# Patient Record
Sex: FEMALE | HISPANIC OR LATINO | Employment: FULL TIME | URBAN - METROPOLITAN AREA
[De-identification: names, ages, dates, MRNs, and addresses within clinical notes are randomized per-mention and may not be internally consistent; named-entity substitution may affect disease eponyms.]

---

## 2023-01-12 ENCOUNTER — APPOINTMENT (OUTPATIENT)
Dept: RADIOLOGY | Facility: MEDICAL CENTER | Age: 33
End: 2023-01-12
Attending: EMERGENCY MEDICINE
Payer: COMMERCIAL

## 2023-01-12 ENCOUNTER — HOSPITAL ENCOUNTER (EMERGENCY)
Facility: MEDICAL CENTER | Age: 33
End: 2023-01-12
Attending: EMERGENCY MEDICINE
Payer: COMMERCIAL

## 2023-01-12 VITALS
OXYGEN SATURATION: 96 % | RESPIRATION RATE: 15 BRPM | HEART RATE: 109 BPM | SYSTOLIC BLOOD PRESSURE: 138 MMHG | WEIGHT: 189.82 LBS | HEIGHT: 62 IN | DIASTOLIC BLOOD PRESSURE: 75 MMHG | TEMPERATURE: 98 F | BODY MASS INDEX: 34.93 KG/M2

## 2023-01-12 DIAGNOSIS — R31.9 URINARY TRACT INFECTION WITH HEMATURIA, SITE UNSPECIFIED: ICD-10-CM

## 2023-01-12 DIAGNOSIS — O20.9 VAGINAL BLEEDING IN PREGNANCY, FIRST TRIMESTER: ICD-10-CM

## 2023-01-12 DIAGNOSIS — N39.0 URINARY TRACT INFECTION WITH HEMATURIA, SITE UNSPECIFIED: ICD-10-CM

## 2023-01-12 LAB
ALBUMIN SERPL BCP-MCNC: 4 G/DL (ref 3.2–4.9)
ALBUMIN/GLOB SERPL: 1.2 G/DL
ALP SERPL-CCNC: 68 U/L (ref 30–99)
ALT SERPL-CCNC: 21 U/L (ref 2–50)
ANION GAP SERPL CALC-SCNC: 14 MMOL/L (ref 7–16)
APPEARANCE UR: CLEAR
AST SERPL-CCNC: 21 U/L (ref 12–45)
B-HCG SERPL-ACNC: ABNORMAL MIU/ML (ref 0–5)
BACTERIA #/AREA URNS HPF: ABNORMAL /HPF
BACTERIA GENITAL QL WET PREP: NORMAL
BASOPHILS # BLD AUTO: 0.2 % (ref 0–1.8)
BASOPHILS # BLD: 0.03 K/UL (ref 0–0.12)
BILIRUB SERPL-MCNC: 0.2 MG/DL (ref 0.1–1.5)
BILIRUB UR QL STRIP.AUTO: NEGATIVE
BUN SERPL-MCNC: 7 MG/DL (ref 8–22)
CALCIUM ALBUM COR SERPL-MCNC: 10.3 MG/DL (ref 8.5–10.5)
CALCIUM SERPL-MCNC: 10.3 MG/DL (ref 8.5–10.5)
CHLORIDE SERPL-SCNC: 104 MMOL/L (ref 96–112)
CO2 SERPL-SCNC: 19 MMOL/L (ref 20–33)
COLOR UR: YELLOW
CREAT SERPL-MCNC: 0.45 MG/DL (ref 0.5–1.4)
EOSINOPHIL # BLD AUTO: 0.08 K/UL (ref 0–0.51)
EOSINOPHIL NFR BLD: 0.7 % (ref 0–6.9)
EPI CELLS #/AREA URNS HPF: ABNORMAL /HPF
ERYTHROCYTE [DISTWIDTH] IN BLOOD BY AUTOMATED COUNT: 41.2 FL (ref 35.9–50)
GFR SERPLBLD CREATININE-BSD FMLA CKD-EPI: 129 ML/MIN/1.73 M 2
GLOBULIN SER CALC-MCNC: 3.3 G/DL (ref 1.9–3.5)
GLUCOSE SERPL-MCNC: 93 MG/DL (ref 65–99)
GLUCOSE UR STRIP.AUTO-MCNC: NEGATIVE MG/DL
HCT VFR BLD AUTO: 40.6 % (ref 37–47)
HGB BLD-MCNC: 14 G/DL (ref 12–16)
HYALINE CASTS #/AREA URNS LPF: ABNORMAL /LPF
IMM GRANULOCYTES # BLD AUTO: 0.06 K/UL (ref 0–0.11)
IMM GRANULOCYTES NFR BLD AUTO: 0.5 % (ref 0–0.9)
KETONES UR STRIP.AUTO-MCNC: 15 MG/DL
LEUKOCYTE ESTERASE UR QL STRIP.AUTO: ABNORMAL
LYMPHOCYTES # BLD AUTO: 1.99 K/UL (ref 1–4.8)
LYMPHOCYTES NFR BLD: 16.6 % (ref 22–41)
MCH RBC QN AUTO: 29.7 PG (ref 27–33)
MCHC RBC AUTO-ENTMCNC: 34.5 G/DL (ref 33.6–35)
MCV RBC AUTO: 86.2 FL (ref 81.4–97.8)
MICRO URNS: ABNORMAL
MONOCYTES # BLD AUTO: 0.76 K/UL (ref 0–0.85)
MONOCYTES NFR BLD AUTO: 6.3 % (ref 0–13.4)
MUCOUS THREADS #/AREA URNS HPF: ABNORMAL /HPF
NEUTROPHILS # BLD AUTO: 9.1 K/UL (ref 2–7.15)
NEUTROPHILS NFR BLD: 75.7 % (ref 44–72)
NITRITE UR QL STRIP.AUTO: POSITIVE
NRBC # BLD AUTO: 0 K/UL
NRBC BLD-RTO: 0 /100 WBC
NUMBER OF RH DOSES IND 8505RD: NORMAL
PH UR STRIP.AUTO: 5.5 [PH] (ref 5–8)
PLATELET # BLD AUTO: 191 K/UL (ref 164–446)
PMV BLD AUTO: 11.4 FL (ref 9–12.9)
POTASSIUM SERPL-SCNC: 3.4 MMOL/L (ref 3.6–5.5)
PROT SERPL-MCNC: 7.3 G/DL (ref 6–8.2)
PROT UR QL STRIP: NEGATIVE MG/DL
RBC # BLD AUTO: 4.71 M/UL (ref 4.2–5.4)
RBC # URNS HPF: ABNORMAL /HPF
RBC UR QL AUTO: NEGATIVE
RH BLD: NORMAL
SIGNIFICANT IND 70042: NORMAL
SITE SITE: NORMAL
SODIUM SERPL-SCNC: 137 MMOL/L (ref 135–145)
SOURCE SOURCE: NORMAL
SP GR UR STRIP.AUTO: 1.02
UROBILINOGEN UR STRIP.AUTO-MCNC: 0.2 MG/DL
WBC # BLD AUTO: 12 K/UL (ref 4.8–10.8)
WBC #/AREA URNS HPF: ABNORMAL /HPF

## 2023-01-12 PROCEDURE — 84702 CHORIONIC GONADOTROPIN TEST: CPT

## 2023-01-12 PROCEDURE — 80053 COMPREHEN METABOLIC PANEL: CPT

## 2023-01-12 PROCEDURE — 85025 COMPLETE CBC W/AUTO DIFF WBC: CPT

## 2023-01-12 PROCEDURE — 87491 CHLMYD TRACH DNA AMP PROBE: CPT

## 2023-01-12 PROCEDURE — 86901 BLOOD TYPING SEROLOGIC RH(D): CPT

## 2023-01-12 PROCEDURE — 76817 TRANSVAGINAL US OBSTETRIC: CPT

## 2023-01-12 PROCEDURE — A9270 NON-COVERED ITEM OR SERVICE: HCPCS | Performed by: EMERGENCY MEDICINE

## 2023-01-12 PROCEDURE — 81001 URINALYSIS AUTO W/SCOPE: CPT

## 2023-01-12 PROCEDURE — 87591 N.GONORRHOEAE DNA AMP PROB: CPT

## 2023-01-12 PROCEDURE — 99284 EMERGENCY DEPT VISIT MOD MDM: CPT

## 2023-01-12 PROCEDURE — 700102 HCHG RX REV CODE 250 W/ 637 OVERRIDE(OP): Performed by: EMERGENCY MEDICINE

## 2023-01-12 PROCEDURE — 87086 URINE CULTURE/COLONY COUNT: CPT

## 2023-01-12 PROCEDURE — 36415 COLL VENOUS BLD VENIPUNCTURE: CPT

## 2023-01-12 RX ORDER — CEPHALEXIN 500 MG/1
500 CAPSULE ORAL ONCE
Status: COMPLETED | OUTPATIENT
Start: 2023-01-12 | End: 2023-01-12

## 2023-01-12 RX ORDER — CEPHALEXIN 500 MG/1
500 CAPSULE ORAL 2 TIMES DAILY
Qty: 10 CAPSULE | Refills: 0 | Status: ACTIVE | OUTPATIENT
Start: 2023-01-12 | End: 2023-01-17

## 2023-01-12 RX ADMIN — CEPHALEXIN 500 MG: 500 CAPSULE ORAL at 20:03

## 2023-01-13 LAB
C TRACH DNA GENITAL QL NAA+PROBE: NEGATIVE
N GONORRHOEA DNA GENITAL QL NAA+PROBE: NEGATIVE
SPECIMEN SOURCE: NORMAL

## 2023-01-13 NOTE — ED PROVIDER NOTES
"ED Provider Note    CHIEF COMPLAINT  Chief Complaint   Patient presents with    Vaginal Bleeding     14 weeks pregnant, blood when going to the restroom         HPI    Primary care provider: Pcp Pt States None   History obtained from: Patient and video   History limited by: None     Lillian Wesley is a 32 y.o. female who presents to the ED with  and daughter complaining of vaginal bleeding with 14 weeks pregnancy.  Patient is  at 14 weeks and has not had prenatal care during this pregnancy so far.  She reports urinating around 2:00 this afternoon and noticed a few drops of vaginal bleeding.  She denies any pain.  She denies fever/chills/chest pain/shortness of breath or difficulty breathing/nausea/vomiting/diarrhea/constipation/dysuria/rash/edema.  Patient is unsure of her blood type.    REVIEW OF SYSTEMS  Please see HPI for pertinent positives/negatives.  All other systems reviewed and are negative.     PAST MEDICAL HISTORY  No past medical history on file.     SURGICAL HISTORY  No past surgical history on file.     SOCIAL HISTORY  Social History     Tobacco Use    Smoking status: Never    Smokeless tobacco: Not on file   Substance and Sexual Activity    Alcohol use: No    Drug use: Not on file    Sexual activity: Not on file        FAMILY HISTORY  No family history on file.     CURRENT MEDICATIONS  Home Medications       Reviewed by Mabel Rodriguez R.N. (Registered Nurse) on 23 at 1731  Med List Status: Not Addressed     Medication Last Dose Status        Patient Nacho Taking any Medications                            ALLERGIES  No Known Allergies     PHYSICAL EXAM  VITAL SIGNS: /75   Pulse (!) 109   Temp 36.7 °C (98 °F) (Temporal)   Resp 15   Ht 1.575 m (5' 2\")   Wt 86.1 kg (189 lb 13.1 oz)   LMP  (LMP Unknown)   SpO2 96%   BMI 34.72 kg/m²  @VITALIY[[id number]::@     Pulse ox interpretation: 98% I interpret this pulse ox as normal     Constitutional: Well developed, well " nourished, alert in no apparent distress, nontoxic appearance    HENT: No external signs of trauma, normocephalic, mask on due to COVID-19 pandemic  Eyes: PERRL, conjunctiva without erythema, no discharge, no icterus    Neck: Soft and supple, trachea midline, no stridor, no tenderness, no LAD, no JVD, good ROM    Cardiovascular: Regular rate and rhythm, no murmurs/rubs/gallops, strong distal pulses and good perfusion    Thorax & Lungs: No respiratory distress, CTAB   Abdomen: Soft, nontender, nondistended, no guarding, no rebound, normal BS    : Female chaperon present for exam.  NEFG, vaginal canal without discharge/blood/FB, cervix closed, no CMT, no uterine TTP, no AT or palpable mass    Back: No CVAT    Extremities: No cyanosis, no edema, no gross deformity, good ROM, no tenderness, intact distal pulses with brisk cap refill    Skin: Warm, dry, no pallor/cyanosis, no rash noted    Lymphatic: No lymphadenopathy noted    Neuro: A/O times 3, no focal deficits noted    Psychiatric: Cooperative, normal mood and affect, normal judgement, appropriate for clinical situation        DIAGNOSTIC STUDIES / PROCEDURES        LABS  All labs reviewed by me.     Results for orders placed or performed during the hospital encounter of 01/12/23   CBC WITH DIFFERENTIAL   Result Value Ref Range    WBC 12.0 (H) 4.8 - 10.8 K/uL    RBC 4.71 4.20 - 5.40 M/uL    Hemoglobin 14.0 12.0 - 16.0 g/dL    Hematocrit 40.6 37.0 - 47.0 %    MCV 86.2 81.4 - 97.8 fL    MCH 29.7 27.0 - 33.0 pg    MCHC 34.5 33.6 - 35.0 g/dL    RDW 41.2 35.9 - 50.0 fL    Platelet Count 191 164 - 446 K/uL    MPV 11.4 9.0 - 12.9 fL    Neutrophils-Polys 75.70 (H) 44.00 - 72.00 %    Lymphocytes 16.60 (L) 22.00 - 41.00 %    Monocytes 6.30 0.00 - 13.40 %    Eosinophils 0.70 0.00 - 6.90 %    Basophils 0.20 0.00 - 1.80 %    Immature Granulocytes 0.50 0.00 - 0.90 %    Nucleated RBC 0.00 /100 WBC    Neutrophils (Absolute) 9.10 (H) 2.00 - 7.15 K/uL    Lymphs (Absolute) 1.99 1.00  - 4.80 K/uL    Monos (Absolute) 0.76 0.00 - 0.85 K/uL    Eos (Absolute) 0.08 0.00 - 0.51 K/uL    Baso (Absolute) 0.03 0.00 - 0.12 K/uL    Immature Granulocytes (abs) 0.06 0.00 - 0.11 K/uL    NRBC (Absolute) 0.00 K/uL   COMP METABOLIC PANEL   Result Value Ref Range    Sodium 137 135 - 145 mmol/L    Potassium 3.4 (L) 3.6 - 5.5 mmol/L    Chloride 104 96 - 112 mmol/L    Co2 19 (L) 20 - 33 mmol/L    Anion Gap 14.0 7.0 - 16.0    Glucose 93 65 - 99 mg/dL    Bun 7 (L) 8 - 22 mg/dL    Creatinine 0.45 (L) 0.50 - 1.40 mg/dL    Calcium 10.3 8.5 - 10.5 mg/dL    AST(SGOT) 21 12 - 45 U/L    ALT(SGPT) 21 2 - 50 U/L    Alkaline Phosphatase 68 30 - 99 U/L    Total Bilirubin 0.2 0.1 - 1.5 mg/dL    Albumin 4.0 3.2 - 4.9 g/dL    Total Protein 7.3 6.0 - 8.2 g/dL    Globulin 3.3 1.9 - 3.5 g/dL    A-G Ratio 1.2 g/dL   RH TYPE FOR RHOGAM FROM E.D.   Result Value Ref Range    Emergency Department Rh Typing POS     Number Of Rh Doses Indicated ZERO    HCG QUANTITATIVE   Result Value Ref Range    Bhcg 06985.0 (H) 0.0 - 5.0 mIU/mL   URINALYSIS,CULTURE IF INDICATED    Specimen: Urine   Result Value Ref Range    Color Yellow     Character Clear     Specific Gravity 1.022 <1.035    Ph 5.5 5.0 - 8.0    Glucose Negative Negative mg/dL    Ketones 15 (A) Negative mg/dL    Protein Negative Negative mg/dL    Bilirubin Negative Negative    Urobilinogen, Urine 0.2 Negative    Nitrite Positive (A) Negative    Leukocyte Esterase Trace (A) Negative    Occult Blood Negative Negative    Micro Urine Req Microscopic    ESTIMATED GFR   Result Value Ref Range    GFR (CKD-EPI) 129 >60 mL/min/1.73 m 2   CORRECTED CALCIUM   Result Value Ref Range    Correct Calcium 10.3 8.5 - 10.5 mg/dL   Chlamydia/GC, PCR (Genital/Anal swab)   Result Value Ref Range    Source Genital    WET PREP    Specimen: Genital   Result Value Ref Range    Significant Indicator NEG     Source GEN     Site Vaginal     Wet Prep For Parasites       Moderate WBCs seen.  No yeast.  No motile  Trichomonas seen.  No clue cells seen.     URINE MICROSCOPIC (W/UA)   Result Value Ref Range    WBC 10-20 (A) /hpf    RBC 0-2 /hpf    Bacteria Moderate (A) None /hpf    Epithelial Cells Few /hpf    Mucous Threads Few /hpf    Hyaline Cast 0-2 /lpf   URINE CULTURE(NEW)    Specimen: Urine   Result Value Ref Range    Significant Indicator NEG     Source UR     Site -     Culture Result -         RADIOLOGY  I have independently interpreted the diagnostic imaging associated with this visit and am waiting the final reading from the radiologist.     US-OB TRANSVAGINAL ONLY   Final Result            Single living intrauterine pregnancy at 14 weeks, 0 days estimated gestational age.             COURSE & MEDICAL DECISION MAKING  Nursing notes, VS, PMSFHx reviewed in chart.     Review of past medical records shows the patient was seen at urgent care on January 11, 2015 for finger laceration.      Differential diagnoses considered include but are not limited to: Pregnancy, ectopic, Ab/miscarriage, fetal demise, STI, PID, cervicitis, vaginitis       ED Observation Status? Yes; I am placing the patient in to an observation status due to a diagnostic uncertainty as well as therapeutic intensity. Patient placed in observation status at 6:18 PM, 1/12/2023.       INITIAL ASSESSMENT AND PLAN  Care Narrative: This is a 32-year-old female at 14 weeks pregnancy who presents complaining of vaginal bleeding.  Will obtain labs and ultrasound and perform pelvic exam.      Escalation of care considered, and ultimately not performed: acute inpatient care management, however at this time, the patient is most appropriate for outpatient management.     Barriers to care at this time, including but not limited to: Select: Patient does not have established PCP.     Decision tools and prescription drugs considered including, but not limited to: Select: Antibiotics   .        History and physical exam as above.  Work-up for vaginal bleeding with  pregnancy was initiated.  Patient is Rh+ and does not require RhoGAM.  Ultrasound shows single IUP at 14 weeks.  Mild leukocytosis likely reactive in nature.  Patient with findings of UTI and will be started on Keflex given that she is pregnant.  Her pelvic exam is benign and rest of her exam is benign as well.  I discussed the findings with patient and  using video .  This is an alert and pleasant patient in no acute distress and nontoxic in appearance and clinically stable during her ED stay.  No further vaginal bleeding.  No evidence for emergent or surgical abdomen.  She was advised on close outpatient follow-up and given return to ED precautions.  Patient verbalized understanding and agreed to plan of care with no further questions or concerns.      The patient is referred to a primary physician for blood pressure management, diabetic screening, and for all other preventative health concerns.       FINAL IMPRESSION  1. Vaginal bleeding in pregnancy, first trimester Acute   2. Urinary tract infection with hematuria, site unspecified Active          DISPOSITION  Patient will be discharged home in stable condition.       FOLLOW UP  Please follow-up with your OB/GYN    Call in 1 day      [facility], Emergency Dept  [address] [place] [id number] [phone]    If symptoms worsen         OUTPATIENT MEDICATIONS  Discharge Medication List as of 1/12/2023  8:07 PM        START taking these medications    Details   cephALEXin (KEFLEX) 500 MG Cap Take 1 Capsule by mouth 2 times a day for 5 days., Disp-10 Capsule, R-0, Print Rx Paper                Electronically signed by: Julian Aguilar D.O., 1/12/2023 6:08 PM      Portions of this record were made with voice recognition software.  Despite my review, spelling/grammar/context errors may still remain.  Interpretation of this chart should be taken in this context.

## 2023-01-13 NOTE — ED NOTES
"Pt discharged to lobby with steady gait, AOx4. IV discontinued and gauze placed, pt in possession of belongings. Pt provided discharge education and information pertaining to medications and follow up appointments,  used #[id number]. Pt received copy of discharge instructions and verbalized understanding. /75   Pulse (!) 109   Temp 36.7 °C (98 °F) (Temporal)   Resp 15   Ht 1.575 m (5' 2\")   Wt 86.1 kg (189 lb 13.1 oz)   LMP  (LMP Unknown)   SpO2 96%   BMI 34.72 kg/m²     "

## 2023-01-13 NOTE — ED TRIAGE NOTES
"Chief Complaint   Patient presents with    Vaginal Bleeding     14 weeks pregnant, blood when going to the restroom      Pt ambulatory into triage for complaints of vaginal bleeding after going to the restroom. Pt is approximately 14 weeks pregnant. Pt states she in not actively bleeding. Pt has been unable to see OB yet, has not has an US confirming pregnancy. Protocol ordered.     Pt is alert and oriented, speaking in full sentences, follows commands and responds appropriately to questions.      Pt placed in lobby. Pt educated on triage process and apologized for wait time. Pt encouraged to alert staff for any changes.     Patient and staff wearing appropriate PPE.      /88   Pulse (!) 112   Temp 36.8 °C (98.2 °F) (Temporal)   Resp 16   Ht 1.575 m (5' 2\")   Wt 86.1 kg (189 lb 13.1 oz)   SpO2 97%       "

## 2023-01-15 LAB
BACTERIA UR CULT: NORMAL
SIGNIFICANT IND 70042: NORMAL
SITE SITE: NORMAL
SOURCE SOURCE: NORMAL

## 2023-04-21 ENCOUNTER — APPOINTMENT (OUTPATIENT)
Dept: OBGYN | Facility: CLINIC | Age: 33
End: 2023-04-21
Payer: MEDICAID

## 2023-04-21 ENCOUNTER — INITIAL PRENATAL (OUTPATIENT)
Dept: OBGYN | Facility: CLINIC | Age: 33
End: 2023-04-21
Payer: MEDICAID

## 2023-04-21 ENCOUNTER — HOSPITAL ENCOUNTER (OUTPATIENT)
Facility: MEDICAL CENTER | Age: 33
End: 2023-04-21
Attending: NURSE PRACTITIONER
Payer: MEDICAID

## 2023-04-21 VITALS
WEIGHT: 194 LBS | DIASTOLIC BLOOD PRESSURE: 70 MMHG | BODY MASS INDEX: 38.09 KG/M2 | HEIGHT: 60 IN | SYSTOLIC BLOOD PRESSURE: 112 MMHG

## 2023-04-21 DIAGNOSIS — L83 ACANTHOSIS NIGRICANS: ICD-10-CM

## 2023-04-21 DIAGNOSIS — Z34.83 ENCOUNTER FOR SUPERVISION OF OTHER NORMAL PREGNANCY IN THIRD TRIMESTER: ICD-10-CM

## 2023-04-21 DIAGNOSIS — Z34.83 ENCOUNTER FOR SUPERVISION OF OTHER NORMAL PREGNANCY IN THIRD TRIMESTER: Primary | ICD-10-CM

## 2023-04-21 PROBLEM — Z34.93 ENCOUNTER FOR SUPERVISION OF NORMAL PREGNANCY IN THIRD TRIMESTER: Status: ACTIVE | Noted: 2023-04-21

## 2023-04-21 LAB — GLUCOSE 1H P 50 G GLC PO SERPL-MCNC: 163 MG/DL (ref 70–139)

## 2023-04-21 PROCEDURE — 80307 DRUG TEST PRSMV CHEM ANLYZR: CPT

## 2023-04-21 PROCEDURE — 0500F INITIAL PRENATAL CARE VISIT: CPT | Performed by: NURSE PRACTITIONER

## 2023-04-21 PROCEDURE — 90715 TDAP VACCINE 7 YRS/> IM: CPT | Performed by: NURSE PRACTITIONER

## 2023-04-21 PROCEDURE — 36415 COLL VENOUS BLD VENIPUNCTURE: CPT

## 2023-04-21 PROCEDURE — 87591 N.GONORRHOEAE DNA AMP PROB: CPT

## 2023-04-21 PROCEDURE — 90471 IMMUNIZATION ADMIN: CPT | Performed by: NURSE PRACTITIONER

## 2023-04-21 PROCEDURE — 87491 CHLMYD TRACH DNA AMP PROBE: CPT

## 2023-04-21 PROCEDURE — 82950 GLUCOSE TEST: CPT

## 2023-04-21 ASSESSMENT — ENCOUNTER SYMPTOMS
RESPIRATORY NEGATIVE: 1
MUSCULOSKELETAL NEGATIVE: 1
CARDIOVASCULAR NEGATIVE: 1
PSYCHIATRIC NEGATIVE: 1
CONSTITUTIONAL NEGATIVE: 1
GASTROINTESTINAL NEGATIVE: 1
EYES NEGATIVE: 1
NEUROLOGICAL NEGATIVE: 1

## 2023-04-21 ASSESSMENT — FIBROSIS 4 INDEX: FIB4 SCORE: 0.82

## 2023-04-21 NOTE — LETTER
Cuente los Movimientos de granger Bebé  Otro paso importante para la samy de granger bebé    Lillian Lupillo     [facility]            Dept: [phone]    ¿Cuántas semanas tiene de embarazo? 28w1d    Fecha cuando tiene que comenzar a contar el movimiento: 4/21/23                  Francia debe usar rosanne diagrama    Lake manera en que granger doctor puede controlar a samy de granger bebé es sabiendo cuantas veces se mueve granger bebé en el útero, o por medio de las “pataditas”.  Usted podrá ayudarle a granger médico al usar cada día el siguiente diagrama.    Cada día, usted debe prestar atención a cuantas horas le lleva a granger bebé moverse 10 veces.  Comience a contar en la mañana, lo antes posible después de haberse levantado.    Primeramente, escriba la hora en que se mueve granger bebé, hasta llegar a 10 veces.  Colóquele un check o palomita a cada cuadrito cada vez que granger bebé se mueva hasta que complete 10 veces.  Escriba la hora cuando termine de contar 10 veces en la última columna.  Sume el total del tiempo que le llevó contar los 10 movimientos.  Finalmente, complete el cuadrito de cuantas horas le llevó hacerlo.    Después de mumtaz contado los 10 movimientos, ya no tendrá que contar los demás movimientos por el miguel del día.  A la mañana siguiente, comience a contar de nuevo cuantas veces se mueve el bebé desde el momento en que se levante.    ¿Qué tendría que considerarse un “movimiento”?  Es difícil de decirlo porque es distinto de lake madre a otra, y de un embarazo a otro.  Lo importante es que cuente el movimiento de la misma manera roman el transcurso de granger embarazo.  Si tiene preguntas adicionales, pregúntele a granger doctor.    ¡Cuente cuidadosamente cada día!     MUESTRA:  Semana 28    ¿Cuántas horas le ha llevado sentir 10 movimientos?        Hora de Inicio     1     2     3     4     5     6     7     8     9     10   Hora de Finlizar   Mitchell. 8:20           11:40   Mar.               Mildred Stephens.                Vie.               Sáb.               Dom.                 IMPORTANTE:  Usted debe contactar a granger doctor si le lleva más de 2 horas sentir 10 movimientos de granger bebé.    Cada mañana, escriba la hora de inicio y comience a contar los movimientos de granger bebé.  Hágalo colocándole un check o palomita a cada cuadrito cada vez que sienta un movimiento de granger bebé.  Cuando haya sentido 10 “pataditas”, escriba la hora en que terminó de contar en la última columna.  Luego, complete en la cajita (arriba de la tim de check o palomita) el número total de horas que le llevó hacerlo.  Asegúrese de leer completamente las instrucciones en la página anterior.

## 2023-04-21 NOTE — PROGRESS NOTES
NOB today. ER  ultrasound 1/12/23  LMP: unknown  Last pap: 11 years ago=negative  Phone # [phone]  Pharmacy confirmed  On PNV  Not interested in genetic testing.  Kick count sheet given today.  TDap today  C/o R foot getting numb and burning sensation going up to her knee.  1 gtt pended and instructions given to patient.  BTL declined

## 2023-04-21 NOTE — PROGRESS NOTES
Subjective     S:  Lillian Wesley is a 33 y.o.  female  @ She is 28w1d with an VANNESSA of Estimated Date of Delivery: 23 based off of US who presents for her new OB exam.      Her OB hx includes  x1.   History of HSV I or II in self or partner: no  History of STIs in self or partner: no  History of Thyroid problems: no    One ER visit for bleeding in this pregnancy. She has no complaints.  Too late for AFP.  Declines CF.  Reports no FM, VB, LOF, or cramping.  Denies dysuria, vaginal DC.  Pt is single and lives with family. FOB is not involved, different from first baby. She is currently working as , discussed heavy lifting, no chemical exposure.  Pregnancy is unplanned but welcomed.    O:    Vitals:    23 1320 23 1323   BP: 112/70    Weight: 194 lb    Height:  5'    See H&P Prenatal Physical.  Wet mount: not indicated        FHTs: 145        Fundal ht: 31cm     A:   1.  IUP @ 28w1d VANNESSA: Estimated Date of Delivery: 23 per US         2.  S=D        3.    Patient Active Problem List    Diagnosis Date Noted    Encounter for supervision of normal pregnancy in third trimester 2023    Acanthosis nigricans 2023         P:  1.  GC/CT ordered. Pap deferred to pp.         2.  Prenatal labs, 1 hr GTT and UDS ordered - lab slip given        3.  Discussed diet and exercise during pregnancy. Encouraged good nutrition, and daily exercise including walking or swimming. Discussed expected weight gain during pregnancy.              4.  Discussed adequate hydration during pregnancy.        5.  NOB packet given        6.  Return to office in 2 wks        7.  Complete OB US at next availability        8.  Pregnancy guide provided        9. TDAP today       10. BTL declined       11. Kick count information provided              HPI    Review of Systems   Constitutional: Negative.    HENT: Negative.     Eyes: Negative.    Respiratory: Negative.     Cardiovascular: Negative.     Gastrointestinal: Negative.    Genitourinary: Negative.    Musculoskeletal: Negative.    Skin: Negative.    Neurological: Negative.    Endo/Heme/Allergies: Negative.    Psychiatric/Behavioral: Negative.              Objective     /70   Ht 5'   Wt 194 lb   LMP  (LMP Unknown)   BMI 37.89 kg/m²      Physical Exam  Vitals and nursing note reviewed.   Constitutional:       Appearance: She is well-developed.   Cardiovascular:      Rate and Rhythm: Normal rate and regular rhythm.      Heart sounds: Normal heart sounds.   Pulmonary:      Effort: Pulmonary effort is normal.      Breath sounds: Normal breath sounds.   Abdominal:      Palpations: Abdomen is soft.   Genitourinary:     Vagina: Normal.      Comments: Uterus enlarged, c/w 31 wk ga  Musculoskeletal:         General: Normal range of motion.      Cervical back: Normal range of motion and neck supple.   Skin:     General: Skin is warm and dry.   Neurological:      Mental Status: She is alert and oriented to person, place, and time.      Deep Tendon Reflexes: Reflexes are normal and symmetric.   Psychiatric:         Behavior: Behavior normal.         Thought Content: Thought content normal.         Judgment: Judgment normal.                           Assessment & Plan        1. Encounter for supervision of other normal pregnancy in third trimester    - URINE DRUG SCREEN W/CONF (AR); Future  - US-OB 2ND 3RD TRI COMPLETE; Future  - Chlamydia/GC, PCR (Urine); Future  - GLUCOSE 1HR GESTATIONAL; Future  - TDAP VACCINE =>6YO IM    2. Acanthosis nigricans

## 2023-04-22 LAB
C TRACH DNA SPEC QL NAA+PROBE: NEGATIVE
N GONORRHOEA DNA SPEC QL NAA+PROBE: NEGATIVE
SPECIMEN SOURCE: NORMAL

## 2023-04-23 LAB
AMPHET CTO UR CFM-MCNC: NEGATIVE NG/ML
BARBITURATES CTO UR CFM-MCNC: NEGATIVE NG/ML
BENZODIAZ CTO UR CFM-MCNC: NEGATIVE NG/ML
CANNABINOIDS CTO UR CFM-MCNC: NEGATIVE NG/ML
COCAINE CTO UR CFM-MCNC: NEGATIVE NG/ML
DRUG COMMENT 753798: NORMAL
METHADONE CTO UR CFM-MCNC: NEGATIVE NG/ML
OPIATES CTO UR CFM-MCNC: NEGATIVE NG/ML
PCP CTO UR CFM-MCNC: NEGATIVE NG/ML
PROPOXYPH CTO UR CFM-MCNC: NEGATIVE NG/ML

## 2023-04-24 ENCOUNTER — APPOINTMENT (OUTPATIENT)
Dept: RADIOLOGY | Facility: IMAGING CENTER | Age: 33
End: 2023-04-24
Attending: NURSE PRACTITIONER
Payer: MEDICAID

## 2023-04-24 DIAGNOSIS — Z34.83 ENCOUNTER FOR SUPERVISION OF OTHER NORMAL PREGNANCY IN THIRD TRIMESTER: ICD-10-CM

## 2023-04-24 DIAGNOSIS — Z34.82 ENCOUNTER FOR SUPERVISION OF OTHER NORMAL PREGNANCY IN SECOND TRIMESTER: ICD-10-CM

## 2023-04-24 DIAGNOSIS — R73.09 ELEVATED GLUCOSE TOLERANCE TEST: ICD-10-CM

## 2023-04-24 PROCEDURE — 76805 OB US >/= 14 WKS SNGL FETUS: CPT | Mod: TC | Performed by: NURSE PRACTITIONER

## 2023-04-26 ENCOUNTER — TELEPHONE (OUTPATIENT)
Dept: OBGYN | Facility: CLINIC | Age: 33
End: 2023-04-26
Payer: MEDICAID

## 2023-04-26 NOTE — TELEPHONE ENCOUNTER
----- Message from GABRIELLE Nielsen sent at 4/24/2023  7:47 AM PDT -----  1 hr GTT elevated, 3 hr ordered-no PNL see in chart, reordered    04/26/23  1606 Called pt on number on file [phone] and the person answering the phone stated that was not pt's number and he did not understand why we have this number to contact pt. Explained that is the only number we have, this person provided a new number for pt [phone].   1608 Called pt at number provided above but there was no answer and it went straight to , but VM not set up. Called the previous number and and person agreed to deliver message for pt to call this RN line back.   1610 pt walk-in to the clinic. Pt notified of abnormal 1hr gtt and need to do 3hr gtt this time. Pt instructed to fast 10-12hr prior to testing. Pt informed she is only allow to drink plain water during fasting time. Advised to bring a snack for after the test is done. Pt notified will be staying in the labs for the 3hr. Pt agreed to do it tomorrow 4/28/2023. Pt agreed and verbalized understanding.

## 2023-04-27 PROBLEM — O40.3XX0 POLYHYDRAMNIOS IN THIRD TRIMESTER: Status: ACTIVE | Noted: 2023-04-27

## 2023-05-05 ENCOUNTER — ROUTINE PRENATAL (OUTPATIENT)
Dept: OBGYN | Facility: CLINIC | Age: 33
End: 2023-05-05
Payer: MEDICAID

## 2023-05-05 VITALS — SYSTOLIC BLOOD PRESSURE: 108 MMHG | DIASTOLIC BLOOD PRESSURE: 60 MMHG | BODY MASS INDEX: 38.63 KG/M2 | WEIGHT: 197.8 LBS

## 2023-05-05 DIAGNOSIS — O40.3XX0 POLYHYDRAMNIOS IN THIRD TRIMESTER COMPLICATION, SINGLE OR UNSPECIFIED FETUS: ICD-10-CM

## 2023-05-05 DIAGNOSIS — B00.1 COLD SORE: ICD-10-CM

## 2023-05-05 PROCEDURE — 59025 FETAL NON-STRESS TEST: CPT | Performed by: NURSE PRACTITIONER

## 2023-05-05 PROCEDURE — 0502F SUBSEQUENT PRENATAL CARE: CPT | Performed by: NURSE PRACTITIONER

## 2023-05-05 RX ORDER — ACYCLOVIR 400 MG/1
400 TABLET ORAL 3 TIMES DAILY
Qty: 90 TABLET | Refills: 3 | Status: SHIPPED | OUTPATIENT
Start: 2023-05-05 | End: 2023-05-05

## 2023-05-05 RX ORDER — ACYCLOVIR 400 MG/1
400 TABLET ORAL 3 TIMES DAILY
Qty: 30 TABLET | Refills: 0 | Status: SHIPPED | OUTPATIENT
Start: 2023-05-05 | End: 2023-05-15

## 2023-05-05 ASSESSMENT — FIBROSIS 4 INDEX: FIB4 SCORE: 0.82

## 2023-05-05 NOTE — PROGRESS NOTES
S:  Pt is  at 30w1d for routine OB follow up.  Reports legs feeling numb at work, also reports painful cold sores on mouth. NO ED or hospital visits since last seen. Reports good FM.  Denies VB, LOF, RUCs or vaginal DC. Pt has not gotten PNLs drawn    O:  Please see above vitals.        1hr GTT: 163 -3 hr GTT ordered          A:  IUP at 30w1d  Patient Active Problem List    Diagnosis Date Noted    Cold sore 2023    Polyhydramnios in third trimester 2023    Elevated glucose tolerance test 2023    Encounter for supervision of normal pregnancy in third trimester 2023    Acanthosis nigricans 2023        P:  1.  PP contraception: unsure.        2.  Continue FKCs.          3.  Questions answered.          4.  Encouraged pt to tour L&D.          5.  Encourage adequate water intake.        6.  F/u 2 wks.        7.  NST today for polyhydramnios -Reactive        8.  US for CARINE ordered        9.  Rx for acyclovir

## 2023-05-05 NOTE — PROGRESS NOTES
Pt here for OB follow up  + FM  Denies VB, LOF or UC's  Good ph: [phone]  Pt states she has no complaints or concerns right now  : [id number]

## 2023-05-12 ENCOUNTER — ROUTINE PRENATAL (OUTPATIENT)
Dept: OBGYN | Facility: CLINIC | Age: 33
End: 2023-05-12
Payer: MEDICAID

## 2023-05-12 DIAGNOSIS — R73.09 ELEVATED GLUCOSE TOLERANCE TEST: ICD-10-CM

## 2023-05-12 DIAGNOSIS — O40.3XX0 POLYHYDRAMNIOS IN THIRD TRIMESTER COMPLICATION, SINGLE OR UNSPECIFIED FETUS: Primary | ICD-10-CM

## 2023-05-12 PROCEDURE — 59025 FETAL NON-STRESS TEST: CPT | Performed by: OBSTETRICS & GYNECOLOGY

## 2023-05-12 NOTE — PROGRESS NOTES
Lillian Wesley   Gestational age: 31w1d     Indication: polyhydramnios    NST Interpretation:  Baseline 135, Reactive

## 2023-05-19 ENCOUNTER — ROUTINE PRENATAL (OUTPATIENT)
Dept: OBGYN | Facility: CLINIC | Age: 33
End: 2023-05-19
Payer: MEDICAID

## 2023-05-19 ENCOUNTER — APPOINTMENT (OUTPATIENT)
Dept: RADIOLOGY | Facility: IMAGING CENTER | Age: 33
End: 2023-05-19
Attending: NURSE PRACTITIONER
Payer: MEDICAID

## 2023-05-19 VITALS — WEIGHT: 197.4 LBS | BODY MASS INDEX: 38.55 KG/M2 | SYSTOLIC BLOOD PRESSURE: 118 MMHG | DIASTOLIC BLOOD PRESSURE: 65 MMHG

## 2023-05-19 DIAGNOSIS — O13.3 GESTATIONAL HYPERTENSION, THIRD TRIMESTER: ICD-10-CM

## 2023-05-19 DIAGNOSIS — O40.3XX0 POLYHYDRAMNIOS IN THIRD TRIMESTER COMPLICATION, SINGLE OR UNSPECIFIED FETUS: ICD-10-CM

## 2023-05-19 DIAGNOSIS — Z34.83 ENCOUNTER FOR SUPERVISION OF OTHER NORMAL PREGNANCY IN THIRD TRIMESTER: Primary | ICD-10-CM

## 2023-05-19 LAB
NST ACOUSTIC STIMULATION: NORMAL
NST ACTION NECESSARY: NORMAL
NST ASSESSMENT: NORMAL
NST BASELINE: NORMAL
NST INDICATIONS: NORMAL
NST OTHER DATA: NORMAL
NST READ BY: NORMAL
NST RETURN: NORMAL
NST UTERINE ACTIVITY: NORMAL

## 2023-05-19 PROCEDURE — 3074F SYST BP LT 130 MM HG: CPT | Performed by: NURSE PRACTITIONER

## 2023-05-19 PROCEDURE — 59025 FETAL NON-STRESS TEST: CPT | Performed by: NURSE PRACTITIONER

## 2023-05-19 PROCEDURE — 76815 OB US LIMITED FETUS(S): CPT | Mod: TC | Performed by: NURSE PRACTITIONER

## 2023-05-19 PROCEDURE — 0502F SUBSEQUENT PRENATAL CARE: CPT | Performed by: NURSE PRACTITIONER

## 2023-05-19 PROCEDURE — 3078F DIAST BP <80 MM HG: CPT | Performed by: NURSE PRACTITIONER

## 2023-05-19 ASSESSMENT — FIBROSIS 4 INDEX: FIB4 SCORE: 0.82

## 2023-05-23 ENCOUNTER — TELEPHONE (OUTPATIENT)
Dept: OBGYN | Facility: CLINIC | Age: 33
End: 2023-05-23
Payer: MEDICAID

## 2023-05-24 DIAGNOSIS — O40.3XX0 POLYHYDRAMNIOS IN THIRD TRIMESTER COMPLICATION, SINGLE OR UNSPECIFIED FETUS: ICD-10-CM

## 2023-05-26 ENCOUNTER — ROUTINE PRENATAL (OUTPATIENT)
Dept: OBGYN | Facility: CLINIC | Age: 33
End: 2023-05-26
Payer: MEDICAID

## 2023-05-26 DIAGNOSIS — O40.3XX0 POLYHYDRAMNIOS IN THIRD TRIMESTER COMPLICATION, SINGLE OR UNSPECIFIED FETUS: ICD-10-CM

## 2023-05-26 PROCEDURE — 59025 FETAL NON-STRESS TEST: CPT | Performed by: PHYSICIAN ASSISTANT

## 2023-05-26 NOTE — PROGRESS NOTES
Per Consult with Dionne Coronado PAC regarding US results form 05/19/23. Pt was informed she still has Polyhydramnios and still needs to continue the weekly NSTs. OB provider will be talking to her about IOL at 39 weeks - 40 weeks and will  need  CARINE check at next visit.  Pt verbalized understanding and had no questions.

## 2023-06-02 ENCOUNTER — ROUTINE PRENATAL (OUTPATIENT)
Dept: OBGYN | Facility: CLINIC | Age: 33
End: 2023-06-02
Payer: MEDICAID

## 2023-06-02 ENCOUNTER — APPOINTMENT (OUTPATIENT)
Dept: RADIOLOGY | Facility: IMAGING CENTER | Age: 33
End: 2023-06-02
Attending: NURSE PRACTITIONER
Payer: MEDICAID

## 2023-06-02 DIAGNOSIS — O13.3 GESTATIONAL HYPERTENSION, THIRD TRIMESTER: ICD-10-CM

## 2023-06-02 DIAGNOSIS — O40.3XX0 POLYHYDRAMNIOS IN THIRD TRIMESTER COMPLICATION, SINGLE OR UNSPECIFIED FETUS: ICD-10-CM

## 2023-06-02 DIAGNOSIS — O40.3XX0 POLYHYDRAMNIOS IN THIRD TRIMESTER COMPLICATION, SINGLE OR UNSPECIFIED FETUS: Primary | ICD-10-CM

## 2023-06-02 PROCEDURE — 99999 PR NO CHARGE: CPT | Performed by: ADVANCED PRACTICE MIDWIFE

## 2023-06-02 PROCEDURE — 3074F SYST BP LT 130 MM HG: CPT | Performed by: ADVANCED PRACTICE MIDWIFE

## 2023-06-02 PROCEDURE — 3078F DIAST BP <80 MM HG: CPT | Performed by: ADVANCED PRACTICE MIDWIFE

## 2023-06-02 PROCEDURE — 59025 FETAL NON-STRESS TEST: CPT | Performed by: ADVANCED PRACTICE MIDWIFE

## 2023-06-02 PROCEDURE — 0502F SUBSEQUENT PRENATAL CARE: CPT | Performed by: ADVANCED PRACTICE MIDWIFE

## 2023-06-02 PROCEDURE — 76815 OB US LIMITED FETUS(S): CPT | Mod: TC | Performed by: NURSE PRACTITIONER

## 2023-06-02 NOTE — PROGRESS NOTES
Pt here today for OB follow up  Pt states no complaints  Reports +FM  Good # [phone] (home) [phone] (work)   Pharmacy Confirmed.  Chaperone offered and declined.

## 2023-06-06 VITALS — BODY MASS INDEX: 38.47 KG/M2 | DIASTOLIC BLOOD PRESSURE: 64 MMHG | SYSTOLIC BLOOD PRESSURE: 116 MMHG | WEIGHT: 197 LBS

## 2023-06-06 ASSESSMENT — FIBROSIS 4 INDEX: FIB4 SCORE: 0.82

## 2023-06-06 NOTE — PROGRESS NOTES
Patient here for MISHA visit at 34w5d of pregnancy. She affirms fetal movement, denies vaginal bleeding or cramping/regular contractions. She reports overall today she is feeling well and without complaints. No recent ER visits since last seen. Having ultrasound for fluid after this appointment. If resolved, may stop testing. Still outstanding for labs. Adequate hydration reviewed in detail with patient. Precautions and warning signs reviewed with patient.  RTC in 1 week(s) for MISHA visit and NST.     Tdap: 4/2023  BTL: declines  PP BCM: unsure  GBS: to be completed at upcoming visit.

## 2023-06-15 ENCOUNTER — ROUTINE PRENATAL (OUTPATIENT)
Dept: OBGYN | Facility: CLINIC | Age: 33
End: 2023-06-15
Payer: MEDICAID

## 2023-06-15 ENCOUNTER — HOSPITAL ENCOUNTER (EMERGENCY)
Facility: MEDICAL CENTER | Age: 33
End: 2023-06-15
Attending: OBSTETRICS & GYNECOLOGY | Admitting: OBSTETRICS & GYNECOLOGY
Payer: MEDICAID

## 2023-06-15 ENCOUNTER — HOSPITAL ENCOUNTER (OUTPATIENT)
Facility: MEDICAL CENTER | Age: 33
End: 2023-06-15
Attending: NURSE PRACTITIONER
Payer: MEDICAID

## 2023-06-15 VITALS — DIASTOLIC BLOOD PRESSURE: 64 MMHG | BODY MASS INDEX: 40.23 KG/M2 | WEIGHT: 206 LBS | SYSTOLIC BLOOD PRESSURE: 110 MMHG

## 2023-06-15 VITALS
HEART RATE: 113 BPM | SYSTOLIC BLOOD PRESSURE: 137 MMHG | WEIGHT: 205 LBS | DIASTOLIC BLOOD PRESSURE: 76 MMHG | OXYGEN SATURATION: 97 % | RESPIRATION RATE: 12 BRPM | TEMPERATURE: 97.3 F | HEIGHT: 60 IN | BODY MASS INDEX: 40.25 KG/M2

## 2023-06-15 DIAGNOSIS — Z34.83 ENCOUNTER FOR SUPERVISION OF OTHER NORMAL PREGNANCY IN THIRD TRIMESTER: Primary | ICD-10-CM

## 2023-06-15 DIAGNOSIS — O36.8130 DECREASED FETAL MOVEMENT AFFECTING MANAGEMENT OF PREGNANCY IN THIRD TRIMESTER, SINGLE OR UNSPECIFIED FETUS: ICD-10-CM

## 2023-06-15 DIAGNOSIS — Z34.83 ENCOUNTER FOR SUPERVISION OF OTHER NORMAL PREGNANCY IN THIRD TRIMESTER: ICD-10-CM

## 2023-06-15 LAB
APPEARANCE UR: CLEAR
COLOR UR AUTO: YELLOW
GLUCOSE UR QL STRIP.AUTO: NEGATIVE MG/DL
KETONES UR QL STRIP.AUTO: NEGATIVE MG/DL
LEUKOCYTE ESTERASE UR QL STRIP.AUTO: NEGATIVE
NITRITE UR QL STRIP.AUTO: NEGATIVE
NST ACOUSTIC STIMULATION: YES
NST ACTION NECESSARY: NORMAL
NST ASSESSMENT: REACTIVE
NST BASELINE: 140
NST INDICATIONS: NORMAL
NST OTHER DATA: NORMAL
NST READ BY: NORMAL
NST RETURN: NORMAL
NST UTERINE ACTIVITY: NORMAL
PH UR STRIP.AUTO: 6 [PH] (ref 5–8)
PROT UR QL STRIP: ABNORMAL MG/DL
RBC UR QL AUTO: ABNORMAL
SP GR UR STRIP.AUTO: >=1.03 (ref 1–1.03)

## 2023-06-15 PROCEDURE — 81002 URINALYSIS NONAUTO W/O SCOPE: CPT

## 2023-06-15 PROCEDURE — 87150 DNA/RNA AMPLIFIED PROBE: CPT

## 2023-06-15 PROCEDURE — 3078F DIAST BP <80 MM HG: CPT | Performed by: NURSE PRACTITIONER

## 2023-06-15 PROCEDURE — 59025 FETAL NON-STRESS TEST: CPT

## 2023-06-15 PROCEDURE — 99284 EMERGENCY DEPT VISIT MOD MDM: CPT

## 2023-06-15 PROCEDURE — 0502F SUBSEQUENT PRENATAL CARE: CPT | Performed by: NURSE PRACTITIONER

## 2023-06-15 PROCEDURE — 3074F SYST BP LT 130 MM HG: CPT | Performed by: NURSE PRACTITIONER

## 2023-06-15 PROCEDURE — 87081 CULTURE SCREEN ONLY: CPT

## 2023-06-15 PROCEDURE — 59025 FETAL NON-STRESS TEST: CPT | Mod: 26 | Performed by: NURSE PRACTITIONER

## 2023-06-15 PROCEDURE — 99282 EMERGENCY DEPT VISIT SF MDM: CPT | Mod: 25 | Performed by: NURSE PRACTITIONER

## 2023-06-15 ASSESSMENT — FIBROSIS 4 INDEX
FIB4 SCORE: 0.82
FIB4 SCORE: 0.82

## 2023-06-15 NOTE — PROGRESS NOTES
S) Pt is a 33 y.o.   at 36w0d  gestation. Routine prenatal care today. Complains of decreased FM x 1 week. States she felt baby move this morning, but not as much. Will do NST after visit, and education provided on FKC and when to follow up. Has not yet done blood work. States she keeps forgetting and eating in the morning. States she will do tomorrow. GBS collected today. US up to date, recent results reviewed. Labor precautions reviewed, all questions answered. SVE and IOL referral at 38 weeks.    Fetal movement Decreased x 1 week. NST today  Cramping no  VB no  LOF no   Denies dysuria. Generally feels well today. Good self-care activities identified. Denies headaches, swelling, visual changes, or epigastric pain .     O) /64   Wt 206 lb         Labs:       PNL: Not done yet       GCT: 163, but has not yet done 3 hour glucose. Will do tomorrow        AFP: Not Examined       GBS: Collected today       Pertinent ultrasound -        2023- Survey WNL, although no movement noted. CARINE 24.30cm, c/w prev dating. EFW 43.5%    Recent US shows CARINE of 18.8cm, no other abnormalities noted    A) IUP at 36w0d       S=D    NST today for Dec FM- Reactive without concerns, , mod variability, pos accels, neg decels. TOCO without regular UC's. Patient marked 4 movements in 40 minutes.     Patient continues to state that baby is not moving and she is concerned. Tried to reassure patient that the NST is reactive and reassuring. Discussed movements and what to delio, but she still states baby is very still opposed to normal. To L&D for further monitoring and BPP         Patient Active Problem List    Diagnosis Date Noted    Cold sore 2023    Elevated glucose tolerance test 2023    Encounter for supervision of normal pregnancy in third trimester 2023    Acanthosis nigricans 2023          SVE: deferred       Chaperone offered: yes         TDAP: yes       FLU: no        BTL: no       :   n/a       C/S Consent:  n/a       IOL or C/S scheduled: no       LAST PAP: None on file, will collect PP         P) s/s ptl vs general discomforts. Fetal movements reviewed. General ed and anticipatory guidance. Nutrition/exercise/vitamin. Plans breast Plans pp contraception- unsure  Continue PNV.

## 2023-06-15 NOTE — PROGRESS NOTES
Pt here today for OB follow up  Pt states she has felt the baby move less this week  Reports +FM  Good # [phone] (home) [phone] (work)   Pharmacy Confirmed.  Chaperone offered and declined.    GBS today

## 2023-06-16 LAB — GP B STREP DNA SPEC QL NAA+PROBE: NEGATIVE

## 2023-06-16 NOTE — ED PROVIDER NOTES
PATIENT ID:  NAME:  Lillian Wesley  MRN:               [id number]  YOB: 1990     33 y.o. female  at 36w0d.    Subjective: Pt reports decreased FM in office early, was instructed to come to L&D for longer monitoring. Reports feeling active movement now.  Pregnancy complicated by elevated glucose, 3 hr GTT not done.     negative  For CTXS.   negative Feels pain   negative for LOF  negative for vaginal bleeding.   positive for fetal movement    ROS: Patient denies any fever chills, nausea, vomiting, headache, chest pain, shortness of breath, or dysuria or unusual swelling of hands or feet.     Objective:    Vitals:    06/15/23 2137 06/15/23 2200   BP:  137/76   Pulse:  (!) 113   Resp:  12   Temp:  36.3 °C (97.3 °F)   TempSrc:  Temporal   SpO2:  97%   Weight: 93 kg (205 lb)    Height: 1.524 m (5')      Temp (24hrs), Av.3 °C (97.3 °F), Min:36.3 °C (97.3 °F), Max:36.3 °C (97.3 °F)    General: No acute distress, resting comfortably in bed.  HEENT: normocephalic, nontraumatic, PERRLA, EOMI  Cardiovascular: Heart RRR with no murmurs, rubs or gallops. Distal Pulses 2+  Respiratory: symmetric chest expansion, lungs CTAB, with no wheezes, rales, rhonci  Abdomen: gravid, nontender  Musculoskeletal: strength 5/5 in four extremities  Neuro: non focal with no numbness, tingling or changes in sensation    Cervix:  deferred  [place]: Uterine Contractions: none.   FHRM: Baseline 130, Accels to 160, no decels, moderate variability    Assessment: 33 y.o. female  at 36w0d.    NST reactive per my read    Plan:   1. Patient is cleared to return home.   2. F/U in office for MISHA  3. Instructions for decreased FM given

## 2023-06-20 ENCOUNTER — HOSPITAL ENCOUNTER (OUTPATIENT)
Dept: LAB | Facility: MEDICAL CENTER | Age: 33
End: 2023-06-20
Attending: NURSE PRACTITIONER
Payer: MEDICAID

## 2023-06-20 ENCOUNTER — ROUTINE PRENATAL (OUTPATIENT)
Dept: OBGYN | Facility: CLINIC | Age: 33
End: 2023-06-20
Payer: MEDICAID

## 2023-06-20 VITALS — SYSTOLIC BLOOD PRESSURE: 110 MMHG | WEIGHT: 207 LBS | DIASTOLIC BLOOD PRESSURE: 66 MMHG | BODY MASS INDEX: 40.43 KG/M2

## 2023-06-20 DIAGNOSIS — R73.09 ELEVATED GLUCOSE TOLERANCE TEST: ICD-10-CM

## 2023-06-20 DIAGNOSIS — Z34.82 ENCOUNTER FOR SUPERVISION OF OTHER NORMAL PREGNANCY IN SECOND TRIMESTER: ICD-10-CM

## 2023-06-20 DIAGNOSIS — O40.3XX0 POLYHYDRAMNIOS IN THIRD TRIMESTER COMPLICATION, SINGLE OR UNSPECIFIED FETUS: ICD-10-CM

## 2023-06-20 DIAGNOSIS — O13.3 GESTATIONAL HYPERTENSION, THIRD TRIMESTER: ICD-10-CM

## 2023-06-20 LAB
ABO GROUP BLD: NORMAL
BLD GP AB SCN SERPL QL: NORMAL
NST ACOUSTIC STIMULATION: NORMAL
NST ACTION NECESSARY: NORMAL
NST ASSESSMENT: REACTIVE
NST BASELINE: 135
NST INDICATIONS: NORMAL
NST OTHER DATA: NORMAL
NST READ BY: NORMAL
NST RETURN: NORMAL
NST UTERINE ACTIVITY: NORMAL
RH BLD: NORMAL

## 2023-06-20 PROCEDURE — 36415 COLL VENOUS BLD VENIPUNCTURE: CPT

## 2023-06-20 PROCEDURE — 0502F SUBSEQUENT PRENATAL CARE: CPT | Performed by: NURSE PRACTITIONER

## 2023-06-20 PROCEDURE — 86780 TREPONEMA PALLIDUM: CPT

## 2023-06-20 PROCEDURE — 82952 GTT-ADDED SAMPLES: CPT

## 2023-06-20 PROCEDURE — 3074F SYST BP LT 130 MM HG: CPT | Performed by: NURSE PRACTITIONER

## 2023-06-20 PROCEDURE — 86803 HEPATITIS C AB TEST: CPT

## 2023-06-20 PROCEDURE — 87340 HEPATITIS B SURFACE AG IA: CPT

## 2023-06-20 PROCEDURE — 85027 COMPLETE CBC AUTOMATED: CPT

## 2023-06-20 PROCEDURE — 87389 HIV-1 AG W/HIV-1&-2 AB AG IA: CPT

## 2023-06-20 PROCEDURE — 86900 BLOOD TYPING SEROLOGIC ABO: CPT

## 2023-06-20 PROCEDURE — 87086 URINE CULTURE/COLONY COUNT: CPT

## 2023-06-20 PROCEDURE — 86762 RUBELLA ANTIBODY: CPT

## 2023-06-20 PROCEDURE — 82951 GLUCOSE TOLERANCE TEST (GTT): CPT

## 2023-06-20 PROCEDURE — 86901 BLOOD TYPING SEROLOGIC RH(D): CPT

## 2023-06-20 PROCEDURE — 99999 PR NO CHARGE: CPT | Performed by: NURSE PRACTITIONER

## 2023-06-20 PROCEDURE — 3078F DIAST BP <80 MM HG: CPT | Performed by: NURSE PRACTITIONER

## 2023-06-20 PROCEDURE — 86850 RBC ANTIBODY SCREEN: CPT

## 2023-06-20 ASSESSMENT — FIBROSIS 4 INDEX: FIB4 SCORE: 0.82

## 2023-06-20 NOTE — PROGRESS NOTES
S) Pt is a 33 y.o.   at 36w5d  gestation. Routine prenatal care today. No complaints today. States baby is moving well. Still doing NST for poly, but poly has resolved. Desires to continue NST's as she is always concerned about movement. Has appt to do blood work today following appt. Labor precautions reviewed, GBS negative. Likely will need IOL at 39 weeks. All questions answered.    Fetal movement Normal  Cramping no  VB no  LOF no   Denies dysuria. Generally feels well today. Good self-care activities identified. Denies headaches, swelling, visual changes, or epigastric pain .     O) /66   Wt 207 lb         Labs:       PNL: Not done yet       GCT: 163, has appt today for 3 hour        AFP: Not Examined       GBS: negative       Pertinent ultrasound -         2023- Survey WNL, although no movement noted. CARINE 24.30cm, c/w prev dating. EFW 43.5%     Recent US shows CARINE of 18.8cm, no other abnormalities noted    A) IUP at 36w5d       S>D    NST today (hx of poly)- reactive without concerns         Patient Active Problem List    Diagnosis Date Noted    Cold sore 2023    Elevated glucose tolerance test 2023    Encounter for supervision of normal pregnancy in third trimester 2023    Acanthosis nigricans 2023          SVE: deferred       Chaperone offered: n/a         TDAP: yes       FLU: no        BTL: no       :  n/a       C/S Consent:  n/a       IOL or C/S scheduled: no       LAST PAP: Will collect PP         P) s/s ptl vs general discomforts. Fetal movements reviewed. General ed and anticipatory guidance. Nutrition/exercise/vitamin. Plans breast Plans pp contraception- unsure  Continue PNV.

## 2023-06-20 NOTE — PROGRESS NOTES
Pt. Here for OB/FU and NST. Reports Good FM.   Good # [phone]  Pt states no complaints.   GBS negative

## 2023-06-21 DIAGNOSIS — O24.419 GESTATIONAL DIABETES MELLITUS (GDM) IN THIRD TRIMESTER, GESTATIONAL DIABETES METHOD OF CONTROL UNSPECIFIED: ICD-10-CM

## 2023-06-21 LAB
ERYTHROCYTE [DISTWIDTH] IN BLOOD BY AUTOMATED COUNT: 46.6 FL (ref 35.9–50)
GLUCOSE 1H P CHAL SERPL-MCNC: 182 MG/DL (ref 65–180)
GLUCOSE 2H P CHAL SERPL-MCNC: 160 MG/DL (ref 65–155)
GLUCOSE 3H P CHAL SERPL-MCNC: 76 MG/DL (ref 65–140)
GLUCOSE BS SERPL-MCNC: 84 MG/DL (ref 65–95)
HBV SURFACE AG SER QL: ABNORMAL
HCT VFR BLD AUTO: 36.7 % (ref 37–47)
HCV AB SER QL: ABNORMAL
HGB BLD-MCNC: 11.6 G/DL (ref 12–16)
HIV 1+2 AB+HIV1 P24 AG SERPL QL IA: NORMAL
MCH RBC QN AUTO: 27.3 PG (ref 27–33)
MCHC RBC AUTO-ENTMCNC: 31.6 G/DL (ref 32.2–35.5)
MCV RBC AUTO: 86.4 FL (ref 81.4–97.8)
PLATELET # BLD AUTO: 182 K/UL (ref 164–446)
PMV BLD AUTO: 11.6 FL (ref 9–12.9)
RBC # BLD AUTO: 4.25 M/UL (ref 4.2–5.4)
RUBV AB SER QL: 110 IU/ML
T PALLIDUM AB SER QL IA: ABNORMAL
WBC # BLD AUTO: 10 K/UL (ref 4.8–10.8)

## 2023-06-21 RX ORDER — GLUCOSAMINE HCL/CHONDROITIN SU 500-400 MG
CAPSULE ORAL
Qty: 100 EACH | Refills: 1 | Status: ON HOLD | OUTPATIENT
Start: 2023-06-21 | End: 2023-07-08

## 2023-06-21 RX ORDER — LANCETS 30 GAUGE
EACH MISCELLANEOUS
Qty: 100 EACH | Refills: 0 | Status: ON HOLD | OUTPATIENT
Start: 2023-06-21 | End: 2023-07-08

## 2023-06-22 ENCOUNTER — TELEPHONE (OUTPATIENT)
Dept: OBGYN | Facility: CLINIC | Age: 33
End: 2023-06-22

## 2023-06-22 LAB
BACTERIA UR CULT: NORMAL
SIGNIFICANT IND 70042: NORMAL
SITE SITE: NORMAL
SOURCE SOURCE: NORMAL

## 2023-06-22 NOTE — TELEPHONE ENCOUNTER
Called pt to inform her that she needs to request the FMLA form from her employer and bring it in office with the date of when she would like to start.   Pt understood

## 2023-06-23 ENCOUNTER — TELEPHONE (OUTPATIENT)
Dept: OBGYN | Facility: CLINIC | Age: 33
End: 2023-06-23

## 2023-06-23 ENCOUNTER — TELEPHONE (OUTPATIENT)
Dept: OBGYN | Facility: CLINIC | Age: 33
End: 2023-06-23
Payer: MEDICAID

## 2023-06-23 ENCOUNTER — NON-PROVIDER VISIT (OUTPATIENT)
Dept: OBGYN | Facility: CLINIC | Age: 33
End: 2023-06-23
Payer: MEDICAID

## 2023-06-23 VITALS — BODY MASS INDEX: 41.21 KG/M2 | WEIGHT: 211 LBS

## 2023-06-23 ASSESSMENT — FIBROSIS 4 INDEX: FIB4 SCORE: 0.86

## 2023-06-23 NOTE — PROGRESS NOTES
Lillian attended [place] Gestational Diabetes class. Family Hx : mom ans uncles x5-DM of known type. Pt brought in a Relion Premier glucose meter. Pt was taught verbally and hands on to use meter and finger stick done for a 129 blood sugar, 4 hours pp Mexican torta.   Activity: 30min 5d/wk.  Discussed what she needs to do after delivery for herself and family to limit risk for type two diabetes. All education and handouts given in Egyptian.    Pt here for GDM class-Egyptian. Discuss with pt sources of simple sugars, sources of complex carbs, Carb portions, Protains and fats, plate distribution.  The pt received a 2000 calorie meal plan  (with verification of Jacque's ZAN CDE/RN) consisting of 3 meals and 3 snacks (see media for copy of meal plan).   Recommended meal times:   B: 0800  S: 1030  L: 1300  S: 1600  D: 5434-2586  S: 2200  Pt was educated on carbohydrate containing foods vs non carbohydrate containing foods, the importance of small frequent meals, limiting carbohydrate to 1 serving in the morning, no fruit before noon/12pm, and avoiding all concentrated sweets for the remainder of her pregnancy. Explained the importance of not going >3hrs btwn eating during the day and no longer than 10hours overnight. Patient agrees to follow the meal plan and guidelines provided.   All handouts were given in Egyptian.

## 2023-06-23 NOTE — TELEPHONE ENCOUNTER
----- Message from GABRIELLE Nielsen sent at 6/21/2023  4:25 PM PDT -----  GDM-A1c and diabetic supplies ordered, will need appt for diabetic class and follow up in diabetic clinic      Pt notified of Dx of GDM and needs to go to GDM class and f/u at [facility] with MD. GDM class scheduled for today 6/23/2023 since pt is already 37wk1d today. Explained to pt she needs to  Rx from her pharmacy for glucose meter, test strips and lancets. Address to GDM class given to pt. Pt aware to bring in her log book and meter to GDM f/u with MD. Pt agreed and verbalized understanding.

## 2023-06-23 NOTE — TELEPHONE ENCOUNTER
Pt calling stating she is at her pharmacy and they are telling hr they dod not received her Rx for Glucometer, test strips and lancets.  Called [facility] on [address] and pharmacy tech stated they do have pt's Rx but pt's pt's insurance only cover pt to check once a day. Tech stated she can pay cash or get a PA.   Talked to pt and explained as above. Explained to pt we can do a PA but most likely will not be ready today or get OTC glucose meter from [facility] instead for today. Pt agreed. Pt stated [facility] will not sell her OTC meter. Pt agreed to get OTC meter and supplies. Pt states she was told all of them requires an Rx. Advised pt to go to [facility] and call this RN back (direct line to this RN provided to pt).  when she gets there and this RN will tell her what she needs to get. Pt agrees.   1419 pt called back and instructed to get Relion premier glucometer, test strips, lancets and lancet device. Pt agreed and verbalized understanding.

## 2023-06-26 ENCOUNTER — ROUTINE PRENATAL (OUTPATIENT)
Dept: OBGYN | Facility: CLINIC | Age: 33
End: 2023-06-26
Payer: MEDICAID

## 2023-06-26 DIAGNOSIS — O40.3XX0 POLYHYDRAMNIOS IN THIRD TRIMESTER COMPLICATION, SINGLE OR UNSPECIFIED FETUS: ICD-10-CM

## 2023-06-26 DIAGNOSIS — O24.419 GESTATIONAL DIABETES MELLITUS (GDM) IN THIRD TRIMESTER, GESTATIONAL DIABETES METHOD OF CONTROL UNSPECIFIED: ICD-10-CM

## 2023-06-26 LAB
NST ACOUSTIC STIMULATION: NORMAL
NST ACOUSTIC STIMULATION: NORMAL
NST ACTION NECESSARY: NORMAL
NST ACTION NECESSARY: NORMAL
NST ASSESSMENT: NORMAL
NST ASSESSMENT: NORMAL
NST BASELINE: NORMAL
NST BASELINE: NORMAL
NST INDICATIONS: NORMAL
NST INDICATIONS: NORMAL
NST OTHER DATA: NORMAL
NST OTHER DATA: NORMAL
NST READ BY: NORMAL
NST READ BY: NORMAL
NST RETURN: NORMAL
NST RETURN: NORMAL
NST UTERINE ACTIVITY: NORMAL
NST UTERINE ACTIVITY: NORMAL

## 2023-06-26 PROCEDURE — 59025 FETAL NON-STRESS TEST: CPT

## 2023-06-26 PROCEDURE — 99999 PR NO CHARGE: CPT

## 2023-06-26 NOTE — PROGRESS NOTES
See alternate encounter for NST charting. Reactive NST seen and read by me.     Alyssa Burton C.N.M.

## 2023-06-26 NOTE — PROGRESS NOTES
Non Stress Test    23 11:48 AM     Lillian Wesley is a 33 y.o.  at 37w4d who presents today for NST for elective (previous poly but prefers to continue with NSTs)      NST Read by: Alyssa Burton C.N.M.    Baseline: 140  Variability: moderate  Accels (>2 in 20 min): no  Decels: none  Notes: no contractions on toco  Category: 1      Read: Nonreactive Category 1 NST seen and read by me    Reviewed options for hospital visit for ultrasound (BPP) vs returning in 1-2hrs after having lunch to repeat NST given overall reassuring tracing without reactivity. Elects to return around 1400 for repeat NST      23 2:31 PM     Upon return for repeat NST, NST was the following:     Baseline: 140  Variability: moderate  Accels (>2 in 20 min): yes  Decels: none  Notes: no contractions on toco  Category: 1    Read: Reactive, cat 1 NST seen and read by me    Alyssa Burton C.N.M.

## 2023-06-29 ENCOUNTER — ROUTINE PRENATAL (OUTPATIENT)
Dept: OBGYN | Facility: CLINIC | Age: 33
End: 2023-06-29
Payer: MEDICAID

## 2023-06-29 VITALS — DIASTOLIC BLOOD PRESSURE: 66 MMHG | WEIGHT: 208 LBS | SYSTOLIC BLOOD PRESSURE: 145 MMHG | BODY MASS INDEX: 40.62 KG/M2

## 2023-06-29 DIAGNOSIS — O24.419 GESTATIONAL DIABETES MELLITUS (GDM) IN THIRD TRIMESTER, GESTATIONAL DIABETES METHOD OF CONTROL UNSPECIFIED: ICD-10-CM

## 2023-06-29 LAB
GLUCOSE BLD-MCNC: 90 MG/DL (ref 65–99)
NST ACOUSTIC STIMULATION: NORMAL
NST ACTION NECESSARY: NORMAL
NST ASSESSMENT: NORMAL
NST BASELINE: NORMAL
NST INDICATIONS: NORMAL
NST OTHER DATA: NORMAL
NST READ BY: NORMAL
NST RETURN: NORMAL
NST UTERINE ACTIVITY: NORMAL

## 2023-06-29 PROCEDURE — 82962 GLUCOSE BLOOD TEST: CPT | Performed by: OBSTETRICS & GYNECOLOGY

## 2023-06-29 PROCEDURE — 99203 OFFICE O/P NEW LOW 30 MIN: CPT | Performed by: OBSTETRICS & GYNECOLOGY

## 2023-06-29 PROCEDURE — 3078F DIAST BP <80 MM HG: CPT | Performed by: OBSTETRICS & GYNECOLOGY

## 2023-06-29 PROCEDURE — 3077F SYST BP >= 140 MM HG: CPT | Performed by: OBSTETRICS & GYNECOLOGY

## 2023-06-29 ASSESSMENT — FIBROSIS 4 INDEX: FIB4 SCORE: 0.86

## 2023-06-29 NOTE — PROGRESS NOTES
SUBJECTIVE:  Lillian is being seen today for her obstetrical visit.  She is 38 weeks gestation.  Her obstetrical history is significant for diabetes mellitus, gestational. She complains of hemorrhoids.     OBJECTIVE:  On examination today, fundal height is 38, which is normal..   Fetal heart tones are at 145/minute.     Fasting blood sugars are running at target    Recent ultrasonography showed resolution of polyhydramnios.    ASSESSMENT/PLAN:  1. Intrauterine pregnancy of 38 weeks gestation, with normal growth.  2. Diabetes Mellitus with normal sugar control.  3.  PIH labs sent as systolic over 140..  4.  Induction in 1 week.

## 2023-06-29 NOTE — PROGRESS NOTES
OB f/u NEW GDM. Good # [phone]  Good FM  Pt. Denies VB, LOF, or UC's.   Pharmacy verified.  Diabetic supplies none needed today.  BS in clinic : 90 Pt states last ate at 7:00am  Chaperone offered and not indicated  Pt states she was told she would be induced in between 38-39 weeks and she still doesn't have any info as to when. She states she might have hemorrhoids and it hurts back there when she is sitting and standing. Pt is stating that she feels really bloated and not well when she eats at night so she hasn't been eating a snack

## 2023-07-06 ENCOUNTER — ANESTHESIA (OUTPATIENT)
Dept: ANESTHESIOLOGY | Facility: MEDICAL CENTER | Age: 33
End: 2023-07-06
Payer: MEDICAID

## 2023-07-06 ENCOUNTER — ANESTHESIA EVENT (OUTPATIENT)
Dept: ANESTHESIOLOGY | Facility: MEDICAL CENTER | Age: 33
End: 2023-07-06
Payer: MEDICAID

## 2023-07-06 ENCOUNTER — APPOINTMENT (OUTPATIENT)
Dept: OBGYN | Facility: MEDICAL CENTER | Age: 33
End: 2023-07-06
Attending: OBSTETRICS & GYNECOLOGY
Payer: MEDICAID

## 2023-07-06 ENCOUNTER — HOSPITAL ENCOUNTER (INPATIENT)
Facility: MEDICAL CENTER | Age: 33
LOS: 2 days | End: 2023-07-08
Attending: OBSTETRICS & GYNECOLOGY | Admitting: OBSTETRICS & GYNECOLOGY
Payer: MEDICAID

## 2023-07-06 PROBLEM — O13.9 GESTATIONAL HYPERTENSION: Status: ACTIVE | Noted: 2023-07-06

## 2023-07-06 LAB
ALBUMIN SERPL BCP-MCNC: 3.6 G/DL (ref 3.2–4.9)
ALBUMIN/GLOB SERPL: 1.1 G/DL
ALP SERPL-CCNC: 125 U/L (ref 30–99)
ALT SERPL-CCNC: 21 U/L (ref 2–50)
ANION GAP SERPL CALC-SCNC: 16 MMOL/L (ref 7–16)
AST SERPL-CCNC: 22 U/L (ref 12–45)
BASOPHILS # BLD AUTO: 0.2 % (ref 0–1.8)
BASOPHILS # BLD: 0.02 K/UL (ref 0–0.12)
BILIRUB SERPL-MCNC: 0.2 MG/DL (ref 0.1–1.5)
BUN SERPL-MCNC: 11 MG/DL (ref 8–22)
CALCIUM ALBUM COR SERPL-MCNC: 9.4 MG/DL (ref 8.5–10.5)
CALCIUM SERPL-MCNC: 9.1 MG/DL (ref 8.5–10.5)
CHLORIDE SERPL-SCNC: 101 MMOL/L (ref 96–112)
CO2 SERPL-SCNC: 18 MMOL/L (ref 20–33)
CREAT SERPL-MCNC: 0.4 MG/DL (ref 0.5–1.4)
CREAT UR-MCNC: 143.22 MG/DL
EOSINOPHIL # BLD AUTO: 0.05 K/UL (ref 0–0.51)
EOSINOPHIL NFR BLD: 0.5 % (ref 0–6.9)
ERYTHROCYTE [DISTWIDTH] IN BLOOD BY AUTOMATED COUNT: 49.6 FL (ref 35.9–50)
GFR SERPLBLD CREATININE-BSD FMLA CKD-EPI: 133 ML/MIN/1.73 M 2
GLOBULIN SER CALC-MCNC: 3.4 G/DL (ref 1.9–3.5)
GLUCOSE BLD STRIP.AUTO-MCNC: 80 MG/DL (ref 65–99)
GLUCOSE BLD STRIP.AUTO-MCNC: 86 MG/DL (ref 65–99)
GLUCOSE BLD STRIP.AUTO-MCNC: 88 MG/DL (ref 65–99)
GLUCOSE BLD STRIP.AUTO-MCNC: 94 MG/DL (ref 65–99)
GLUCOSE SERPL-MCNC: 70 MG/DL (ref 65–99)
HCT VFR BLD AUTO: 34.4 % (ref 37–47)
HGB BLD-MCNC: 11.2 G/DL (ref 12–16)
HOLDING TUBE BB 8507: NORMAL
IMM GRANULOCYTES # BLD AUTO: 0.05 K/UL (ref 0–0.11)
IMM GRANULOCYTES NFR BLD AUTO: 0.5 % (ref 0–0.9)
LYMPHOCYTES # BLD AUTO: 1.88 K/UL (ref 1–4.8)
LYMPHOCYTES NFR BLD: 20.4 % (ref 22–41)
MCH RBC QN AUTO: 27.5 PG (ref 27–33)
MCHC RBC AUTO-ENTMCNC: 32.6 G/DL (ref 32.2–35.5)
MCV RBC AUTO: 84.5 FL (ref 81.4–97.8)
MONOCYTES # BLD AUTO: 0.57 K/UL (ref 0–0.85)
MONOCYTES NFR BLD AUTO: 6.2 % (ref 0–13.4)
NEUTROPHILS # BLD AUTO: 6.63 K/UL (ref 1.82–7.42)
NEUTROPHILS NFR BLD: 72.2 % (ref 44–72)
NRBC # BLD AUTO: 0 K/UL
NRBC BLD-RTO: 0 /100 WBC (ref 0–0.2)
PLATELET # BLD AUTO: 219 K/UL (ref 164–446)
PMV BLD AUTO: 10.7 FL (ref 9–12.9)
POTASSIUM SERPL-SCNC: 3.7 MMOL/L (ref 3.6–5.5)
PROT SERPL-MCNC: 7 G/DL (ref 6–8.2)
PROT UR-MCNC: 21 MG/DL (ref 0–15)
PROT/CREAT UR: 147 MG/G (ref 10–107)
RBC # BLD AUTO: 4.07 M/UL (ref 4.2–5.4)
SODIUM SERPL-SCNC: 135 MMOL/L (ref 135–145)
T PALLIDUM AB SER QL IA: NORMAL
WBC # BLD AUTO: 9.2 K/UL (ref 4.8–10.8)

## 2023-07-06 PROCEDURE — 36415 COLL VENOUS BLD VENIPUNCTURE: CPT

## 2023-07-06 PROCEDURE — 86780 TREPONEMA PALLIDUM: CPT

## 2023-07-06 PROCEDURE — 80053 COMPREHEN METABOLIC PANEL: CPT

## 2023-07-06 PROCEDURE — 700105 HCHG RX REV CODE 258: Mod: JZ | Performed by: ANESTHESIOLOGY

## 2023-07-06 PROCEDURE — 700105 HCHG RX REV CODE 258: Performed by: OBSTETRICS & GYNECOLOGY

## 2023-07-06 PROCEDURE — 85025 COMPLETE CBC W/AUTO DIFF WBC: CPT

## 2023-07-06 PROCEDURE — 82962 GLUCOSE BLOOD TEST: CPT | Mod: 91

## 2023-07-06 PROCEDURE — 01967 NEURAXL LBR ANES VAG DLVR: CPT | Performed by: ANESTHESIOLOGY

## 2023-07-06 PROCEDURE — 770002 HCHG ROOM/CARE - OB PRIVATE (112)

## 2023-07-06 PROCEDURE — 700111 HCHG RX REV CODE 636 W/ 250 OVERRIDE (IP): Mod: JZ

## 2023-07-06 PROCEDURE — 303615 HCHG EPIDURAL/SPINAL ANESTHESIA FOR LABOR

## 2023-07-06 PROCEDURE — 82570 ASSAY OF URINE CREATININE: CPT

## 2023-07-06 PROCEDURE — 84156 ASSAY OF PROTEIN URINE: CPT

## 2023-07-06 PROCEDURE — 700105 HCHG RX REV CODE 258: Mod: JZ

## 2023-07-06 PROCEDURE — 3E033VJ INTRODUCTION OF OTHER HORMONE INTO PERIPHERAL VEIN, PERCUTANEOUS APPROACH: ICD-10-PCS | Performed by: OBSTETRICS & GYNECOLOGY

## 2023-07-06 PROCEDURE — 700111 HCHG RX REV CODE 636 W/ 250 OVERRIDE (IP): Performed by: OBSTETRICS & GYNECOLOGY

## 2023-07-06 RX ORDER — TERBUTALINE SULFATE 1 MG/ML
0.25 INJECTION, SOLUTION SUBCUTANEOUS
Status: DISCONTINUED | OUTPATIENT
Start: 2023-07-06 | End: 2023-07-07 | Stop reason: HOSPADM

## 2023-07-06 RX ORDER — ONDANSETRON 4 MG/1
4 TABLET, ORALLY DISINTEGRATING ORAL EVERY 6 HOURS PRN
Status: DISCONTINUED | OUTPATIENT
Start: 2023-07-06 | End: 2023-07-07 | Stop reason: HOSPADM

## 2023-07-06 RX ORDER — ACETAMINOPHEN 500 MG
1000 TABLET ORAL
Status: DISCONTINUED | OUTPATIENT
Start: 2023-07-06 | End: 2023-07-07 | Stop reason: HOSPADM

## 2023-07-06 RX ORDER — OXYTOCIN 10 [USP'U]/ML
10 INJECTION, SOLUTION INTRAMUSCULAR; INTRAVENOUS
Status: DISCONTINUED | OUTPATIENT
Start: 2023-07-06 | End: 2023-07-07 | Stop reason: HOSPADM

## 2023-07-06 RX ORDER — ROPIVACAINE HYDROCHLORIDE 2 MG/ML
INJECTION, SOLUTION EPIDURAL; INFILTRATION; PERINEURAL
Status: COMPLETED
Start: 2023-07-06 | End: 2023-07-06

## 2023-07-06 RX ORDER — ROPIVACAINE HYDROCHLORIDE 2 MG/ML
INJECTION, SOLUTION EPIDURAL; INFILTRATION; PERINEURAL CONTINUOUS
Status: DISCONTINUED | OUTPATIENT
Start: 2023-07-07 | End: 2023-07-07

## 2023-07-06 RX ORDER — EPHEDRINE SULFATE 50 MG/ML
5 INJECTION, SOLUTION INTRAVENOUS
Status: DISCONTINUED | OUTPATIENT
Start: 2023-07-06 | End: 2023-07-07 | Stop reason: HOSPADM

## 2023-07-06 RX ORDER — IBUPROFEN 800 MG/1
800 TABLET ORAL
Status: DISCONTINUED | OUTPATIENT
Start: 2023-07-06 | End: 2023-07-07 | Stop reason: HOSPADM

## 2023-07-06 RX ORDER — SODIUM CHLORIDE, SODIUM LACTATE, POTASSIUM CHLORIDE, AND CALCIUM CHLORIDE .6; .31; .03; .02 G/100ML; G/100ML; G/100ML; G/100ML
1000 INJECTION, SOLUTION INTRAVENOUS
Status: COMPLETED | OUTPATIENT
Start: 2023-07-06 | End: 2023-07-07

## 2023-07-06 RX ORDER — HYDROXYZINE 50 MG/1
50 TABLET, FILM COATED ORAL EVERY 6 HOURS PRN
Status: DISCONTINUED | OUTPATIENT
Start: 2023-07-06 | End: 2023-07-07 | Stop reason: HOSPADM

## 2023-07-06 RX ORDER — ONDANSETRON 2 MG/ML
4 INJECTION INTRAMUSCULAR; INTRAVENOUS EVERY 6 HOURS PRN
Status: DISCONTINUED | OUTPATIENT
Start: 2023-07-06 | End: 2023-07-07 | Stop reason: HOSPADM

## 2023-07-06 RX ORDER — LIDOCAINE HYDROCHLORIDE 10 MG/ML
20 INJECTION, SOLUTION INFILTRATION; PERINEURAL
Status: DISCONTINUED | OUTPATIENT
Start: 2023-07-06 | End: 2023-07-07 | Stop reason: HOSPADM

## 2023-07-06 RX ORDER — SODIUM CHLORIDE, SODIUM LACTATE, POTASSIUM CHLORIDE, AND CALCIUM CHLORIDE .6; .31; .03; .02 G/100ML; G/100ML; G/100ML; G/100ML
250 INJECTION, SOLUTION INTRAVENOUS PRN
Status: DISCONTINUED | OUTPATIENT
Start: 2023-07-06 | End: 2023-07-07 | Stop reason: HOSPADM

## 2023-07-06 RX ORDER — SODIUM CHLORIDE, SODIUM LACTATE, POTASSIUM CHLORIDE, CALCIUM CHLORIDE 600; 310; 30; 20 MG/100ML; MG/100ML; MG/100ML; MG/100ML
INJECTION, SOLUTION INTRAVENOUS CONTINUOUS
Status: DISCONTINUED | OUTPATIENT
Start: 2023-07-06 | End: 2023-07-08 | Stop reason: HOSPADM

## 2023-07-06 RX ADMIN — BUPIVACAINE HYDROCHLORIDE 4 ML: 2.5 INJECTION, SOLUTION EPIDURAL; INFILTRATION; INTRACAUDAL at 23:30

## 2023-07-06 RX ADMIN — SODIUM CHLORIDE, POTASSIUM CHLORIDE, SODIUM LACTATE AND CALCIUM CHLORIDE 1000 ML: 600; 310; 30; 20 INJECTION, SOLUTION INTRAVENOUS at 23:00

## 2023-07-06 RX ADMIN — ROPIVACAINE HYDROCHLORIDE 200 MG: 2 INJECTION, SOLUTION EPIDURAL; INFILTRATION at 23:46

## 2023-07-06 RX ADMIN — ROPIVACAINE HYDROCHLORIDE 200 MG: 2 INJECTION, SOLUTION EPIDURAL; INFILTRATION; PERINEURAL at 23:46

## 2023-07-06 RX ADMIN — SODIUM CHLORIDE, POTASSIUM CHLORIDE, SODIUM LACTATE AND CALCIUM CHLORIDE: 600; 310; 30; 20 INJECTION, SOLUTION INTRAVENOUS at 17:43

## 2023-07-06 RX ADMIN — OXYTOCIN 2 MILLI-UNITS/MIN: 10 INJECTION, SOLUTION INTRAMUSCULAR; INTRAVENOUS at 17:44

## 2023-07-06 ASSESSMENT — COPD QUESTIONNAIRES
DO YOU EVER COUGH UP ANY MUCUS OR PHLEGM?: NO/ONLY WITH OCCASIONAL COLDS OR INFECTIONS
DURING THE PAST 4 WEEKS HOW MUCH DID YOU FEEL SHORT OF BREATH: NONE/LITTLE OF THE TIME
IN THE PAST 12 MONTHS DO YOU DO LESS THAN YOU USED TO BECAUSE OF YOUR BREATHING PROBLEMS: DISAGREE/UNSURE
HAVE YOU SMOKED AT LEAST 100 CIGARETTES IN YOUR ENTIRE LIFE: NO/DON'T KNOW

## 2023-07-06 ASSESSMENT — LIFESTYLE VARIABLES
HAVE PEOPLE ANNOYED YOU BY CRITICIZING YOUR DRINKING: NO
CONSUMPTION TOTAL: NEGATIVE
EVER HAD A DRINK FIRST THING IN THE MORNING TO STEADY YOUR NERVES TO GET RID OF A HANGOVER: NO
HOW MANY TIMES IN THE PAST YEAR HAVE YOU HAD 5 OR MORE DRINKS IN A DAY: 0
TOTAL SCORE: 0
TOTAL SCORE: 0
EVER FELT BAD OR GUILTY ABOUT YOUR DRINKING: NO
EVER_SMOKED: NEVER
ON A TYPICAL DAY WHEN YOU DRINK ALCOHOL HOW MANY DRINKS DO YOU HAVE: 0
AVERAGE NUMBER OF DAYS PER WEEK YOU HAVE A DRINK CONTAINING ALCOHOL: 0
DOES PATIENT WANT TO STOP DRINKING: NO
TOTAL SCORE: 0
HAVE YOU EVER FELT YOU SHOULD CUT DOWN ON YOUR DRINKING: NO
ALCOHOL_USE: NO

## 2023-07-06 ASSESSMENT — PATIENT HEALTH QUESTIONNAIRE - PHQ9
1. LITTLE INTEREST OR PLEASURE IN DOING THINGS: NOT AT ALL
2. FEELING DOWN, DEPRESSED, IRRITABLE, OR HOPELESS: NOT AT ALL
SUM OF ALL RESPONSES TO PHQ9 QUESTIONS 1 AND 2: 0

## 2023-07-06 ASSESSMENT — PAIN DESCRIPTION - PAIN TYPE
TYPE: ACUTE PAIN

## 2023-07-06 ASSESSMENT — FIBROSIS 4 INDEX: FIB4 SCORE: 0.86

## 2023-07-06 NOTE — PROGRESS NOTES
EDC -  EGA - 39-0    0854 - Pt arrived to labor and delivery for scheduled IOL - GDM. Pt placed in room S217.  0921 - External monitors in place X2. Category I FHT at this time. VSS. Pt reports good FM. No complaints of contractions, ROM or vaginal bleeding. SVE 2-3/60/-2. Pt's daughter at bedside. POC discussed with pt and family members, all questions answered.    1000 - Updated Dr Rosas and Dr Flanagan on pt assessment. Orders placed.   1145 - Cook's catheter balloon placed by Dr Rosas. 60u/60v. Pt tolerated well.   1645 - Cook's catheter removed. SVE 5cm/ballotable. Updated Dr Rosas and Dr Flanagan. Orders for pitocin received.

## 2023-07-06 NOTE — H&P
"OB H&P:    CC: IOL for GDM    HPI:  Ms. Lillian Wesley is a 33 y.o.  @ 39w0d by 28w5d US presents for IOL for GDM that is diet controlled.     Contractions: Yes   Loss of fluid: No   Vaginal bleeding: No   Fetal movement: present      PNC with [facility]    PNL:  Blood Type O+, Rubella immune, HIV neg, TrepAb neg, HBsAg NR, GC/CT neg/neg  1 HR GTT: 182  3 HR GTT: 76  GBS -      ROS:  Const: denies fevers, general concerns  CV/resp: reports no concerns  GI: denies abd pain, GI concerns  : see HPI  Neuro: denies HA/vision changes    OB History    Para Term  AB Living   2 1 1     1   SAB IAB Ectopic Molar Multiple Live Births             1      # Outcome Date GA Lbr Matthew/2nd Weight Sex Delivery Anes PTL Lv   2 Current            1 Term 05/19/10    F Vag-Spont   MOISES      Birth Comments: patient does not remember birth weight       GYN: denies STIs    History reviewed. No pertinent past medical history.    History reviewed. No pertinent surgical history.    No current facility-administered medications on file prior to encounter.     No current outpatient medications on file prior to encounter.       Family History   Problem Relation Age of Onset    Diabetes Mother     Heart Disease Maternal Aunt     Diabetes Maternal Aunt     Diabetes Maternal Uncle        Social History     Tobacco Use    Smoking status: Never   Vaping Use    Vaping Use: Never used   Substance Use Topics    Alcohol use: No    Drug use: Never         PE:  Vitals:    23 0925   BP: 133/71   Pulse: 88   Resp: 16   Temp: 36.9 °C (98.5 °F)   TempSrc: Temporal   Weight: 93.9 kg (207 lb)   Height: 1.575 m (5' 2\")       GEN: AAO, NAD  HEENT: normocephalic, atraumatic, EOMI  CV: rrr, no m/r/g  Resp: CTAB, no w/r/r  Abd: soft, gravid, NT  Ext: NT, no peripheral edema  Derm: no visible lesions or rashes  Neuro: grossly intact    SVE: 2/60%/-2  FHT: Baseline 130/moderate variability/+ accels/ - decels  [place]: Contractions 10-12 " min    LABS  Results for orders placed or performed during the hospital encounter of 07/06/23   Comp Metabolic Panel   Result Value Ref Range    Sodium 135 135 - 145 mmol/L    Potassium 3.7 3.6 - 5.5 mmol/L    Chloride 101 96 - 112 mmol/L    Co2 18 (L) 20 - 33 mmol/L    Anion Gap 16.0 7.0 - 16.0    Glucose 70 65 - 99 mg/dL    Bun 11 8 - 22 mg/dL    Creatinine 0.40 (L) 0.50 - 1.40 mg/dL    Calcium 9.1 8.5 - 10.5 mg/dL    AST(SGOT) 22 12 - 45 U/L    ALT(SGPT) 21 2 - 50 U/L    Alkaline Phosphatase 125 (H) 30 - 99 U/L    Total Bilirubin 0.2 0.1 - 1.5 mg/dL    Albumin 3.6 3.2 - 4.9 g/dL    Total Protein 7.0 6.0 - 8.2 g/dL    Globulin 3.4 1.9 - 3.5 g/dL    A-G Ratio 1.1 g/dL   Hold Blood Bank Specimen (Not Tested)   Result Value Ref Range    Holding Tube - Bb DONE    CBC with differential   Result Value Ref Range    WBC 9.2 4.8 - 10.8 K/uL    RBC 4.07 (L) 4.20 - 5.40 M/uL    Hemoglobin 11.2 (L) 12.0 - 16.0 g/dL    Hematocrit 34.4 (L) 37.0 - 47.0 %    MCV 84.5 81.4 - 97.8 fL    MCH 27.5 27.0 - 33.0 pg    MCHC 32.6 32.2 - 35.5 g/dL    RDW 49.6 35.9 - 50.0 fL    Platelet Count 219 164 - 446 K/uL    MPV 10.7 9.0 - 12.9 fL    Neutrophils-Polys 72.20 (H) 44.00 - 72.00 %    Lymphocytes 20.40 (L) 22.00 - 41.00 %    Monocytes 6.20 0.00 - 13.40 %    Eosinophils 0.50 0.00 - 6.90 %    Basophils 0.20 0.00 - 1.80 %    Immature Granulocytes 0.50 0.00 - 0.90 %    Nucleated RBC 0.00 0.00 - 0.20 /100 WBC    Neutrophils (Absolute) 6.63 1.82 - 7.42 K/uL    Lymphs (Absolute) 1.88 1.00 - 4.80 K/uL    Monos (Absolute) 0.57 0.00 - 0.85 K/uL    Eos (Absolute) 0.05 0.00 - 0.51 K/uL    Baso (Absolute) 0.02 0.00 - 0.12 K/uL    Immature Granulocytes (abs) 0.05 0.00 - 0.11 K/uL    NRBC (Absolute) 0.00 K/uL   T.PALLIDUM AB KRISTEN (Syphilis)   Result Value Ref Range    Syphilis, Treponemal Qual Non-Reactive Non-Reactive   CORRECTED CALCIUM   Result Value Ref Range    Correct Calcium 9.4 8.5 - 10.5 mg/dL   ESTIMATED GFR   Result Value Ref Range    GFR  (CKD-EPI) 133 >60 mL/min/1.73 m 2         A/P: 33 y.o.  @ 39w0d by 28w5d US presents for IOL for GDM that is diet controlled. At her last visit on  with Dr. Larsen she had a BP of 140 systolic so he sent PIH labs that were not completed outpatient.      - Admit to L&D  - Initiate routine intrapartum care  - Cat 1 tracing, continue to monitor EFM  -repeat CMP and P/C ratio for PIH concerns   -BP is 133/71 today   - LR @ 125ml/h  -cervical ripening balloon placed at 11:30AM  - Anticipate        Karen Flanagan, DO  PGY-1, [facility]

## 2023-07-06 NOTE — CARE PLAN
Problem: Psychosocial - L&D  Goal: Patient's level of anxiety will decrease  Outcome: Progressing     Problem: Risk for Injury  Goal: Patient and fetus will be free of preventable injury/complications  Outcome: Progressing   The patient is Stable - Low risk of patient condition declining or worsening         Progress made toward(s) clinical / shift goals:  EFM and TOCO in place to monitor for fetal wellbeing and uterine activity.     Patient is not progressing towards the following goals:

## 2023-07-07 LAB
ERYTHROCYTE [DISTWIDTH] IN BLOOD BY AUTOMATED COUNT: 50.9 FL (ref 35.9–50)
GLUCOSE BLD STRIP.AUTO-MCNC: 100 MG/DL (ref 65–99)
GLUCOSE BLD STRIP.AUTO-MCNC: 91 MG/DL (ref 65–99)
GLUCOSE BLD STRIP.AUTO-MCNC: 92 MG/DL (ref 65–99)
HCT VFR BLD AUTO: 32.9 % (ref 37–47)
HGB BLD-MCNC: 10.6 G/DL (ref 12–16)
MCH RBC QN AUTO: 27.5 PG (ref 27–33)
MCHC RBC AUTO-ENTMCNC: 32.2 G/DL (ref 32.2–35.5)
MCV RBC AUTO: 85.5 FL (ref 81.4–97.8)
PLATELET # BLD AUTO: 187 K/UL (ref 164–446)
PMV BLD AUTO: 10.6 FL (ref 9–12.9)
RBC # BLD AUTO: 3.85 M/UL (ref 4.2–5.4)
WBC # BLD AUTO: 12.8 K/UL (ref 4.8–10.8)

## 2023-07-07 PROCEDURE — 36415 COLL VENOUS BLD VENIPUNCTURE: CPT

## 2023-07-07 PROCEDURE — 700111 HCHG RX REV CODE 636 W/ 250 OVERRIDE (IP): Mod: JZ

## 2023-07-07 PROCEDURE — 59409 OBSTETRICAL CARE: CPT

## 2023-07-07 PROCEDURE — 59400 OBSTETRICAL CARE: CPT

## 2023-07-07 PROCEDURE — 700105 HCHG RX REV CODE 258

## 2023-07-07 PROCEDURE — 85027 COMPLETE CBC AUTOMATED: CPT

## 2023-07-07 PROCEDURE — 10907ZC DRAINAGE OF AMNIOTIC FLUID, THERAPEUTIC FROM PRODUCTS OF CONCEPTION, VIA NATURAL OR ARTIFICIAL OPENING: ICD-10-PCS | Performed by: OBSTETRICS & GYNECOLOGY

## 2023-07-07 PROCEDURE — 700102 HCHG RX REV CODE 250 W/ 637 OVERRIDE(OP)

## 2023-07-07 PROCEDURE — 700111 HCHG RX REV CODE 636 W/ 250 OVERRIDE (IP): Performed by: ANESTHESIOLOGY

## 2023-07-07 PROCEDURE — 770002 HCHG ROOM/CARE - OB PRIVATE (112)

## 2023-07-07 PROCEDURE — 99999 PR NO CHARGE: CPT | Performed by: OBSTETRICS & GYNECOLOGY

## 2023-07-07 PROCEDURE — 82962 GLUCOSE BLOOD TEST: CPT | Mod: 91

## 2023-07-07 PROCEDURE — 304965 HCHG RECOVERY SERVICES

## 2023-07-07 PROCEDURE — A9270 NON-COVERED ITEM OR SERVICE: HCPCS

## 2023-07-07 RX ORDER — IBUPROFEN 800 MG/1
800 TABLET ORAL EVERY 8 HOURS PRN
Status: DISCONTINUED | OUTPATIENT
Start: 2023-07-07 | End: 2023-07-08 | Stop reason: HOSPADM

## 2023-07-07 RX ORDER — NIFEDIPINE 10 MG/1
10 CAPSULE ORAL
Status: DISCONTINUED | OUTPATIENT
Start: 2023-07-07 | End: 2023-07-08 | Stop reason: HOSPADM

## 2023-07-07 RX ORDER — VITAMIN A ACETATE, BETA CAROTENE, ASCORBIC ACID, CHOLECALCIFEROL, .ALPHA.-TOCOPHEROL ACETATE, DL-, THIAMINE MONONITRATE, RIBOFLAVIN, NIACINAMIDE, PYRIDOXINE HYDROCHLORIDE, FOLIC ACID, CYANOCOBALAMIN, CALCIUM CARBONATE, FERROUS FUMARATE, ZINC OXIDE, CUPRIC OXIDE 3080; 12; 120; 400; 1; 1.84; 3; 20; 22; 920; 25; 200; 27; 10; 2 [IU]/1; UG/1; MG/1; [IU]/1; MG/1; MG/1; MG/1; MG/1; MG/1; [IU]/1; MG/1; MG/1; MG/1; MG/1; MG/1
1 TABLET, FILM COATED ORAL
Status: DISCONTINUED | OUTPATIENT
Start: 2023-07-07 | End: 2023-07-08 | Stop reason: HOSPADM

## 2023-07-07 RX ORDER — BUPIVACAINE HYDROCHLORIDE 2.5 MG/ML
INJECTION, SOLUTION EPIDURAL; INFILTRATION; INTRACAUDAL PRN
Status: DISCONTINUED | OUTPATIENT
Start: 2023-07-06 | End: 2023-07-07 | Stop reason: SURG

## 2023-07-07 RX ORDER — DOCUSATE SODIUM 100 MG/1
100 CAPSULE, LIQUID FILLED ORAL 2 TIMES DAILY PRN
Status: DISCONTINUED | OUTPATIENT
Start: 2023-07-07 | End: 2023-07-08 | Stop reason: HOSPADM

## 2023-07-07 RX ORDER — ACETAMINOPHEN 500 MG
1000 TABLET ORAL EVERY 6 HOURS PRN
Status: DISCONTINUED | OUTPATIENT
Start: 2023-07-07 | End: 2023-07-08 | Stop reason: HOSPADM

## 2023-07-07 RX ORDER — MISOPROSTOL 200 UG/1
600 TABLET ORAL
Status: DISCONTINUED | OUTPATIENT
Start: 2023-07-07 | End: 2023-07-08 | Stop reason: HOSPADM

## 2023-07-07 RX ORDER — ROPIVACAINE HYDROCHLORIDE 2 MG/ML
INJECTION, SOLUTION EPIDURAL; INFILTRATION; PERINEURAL CONTINUOUS
Status: DISCONTINUED | OUTPATIENT
Start: 2023-07-07 | End: 2023-07-08 | Stop reason: HOSPADM

## 2023-07-07 RX ORDER — CARBOPROST TROMETHAMINE 250 UG/ML
250 INJECTION, SOLUTION INTRAMUSCULAR
Status: DISCONTINUED | OUTPATIENT
Start: 2023-07-07 | End: 2023-07-08 | Stop reason: HOSPADM

## 2023-07-07 RX ORDER — SODIUM CHLORIDE, SODIUM LACTATE, POTASSIUM CHLORIDE, CALCIUM CHLORIDE 600; 310; 30; 20 MG/100ML; MG/100ML; MG/100ML; MG/100ML
INJECTION, SOLUTION INTRAVENOUS PRN
Status: DISCONTINUED | OUTPATIENT
Start: 2023-07-07 | End: 2023-07-08 | Stop reason: HOSPADM

## 2023-07-07 RX ADMIN — OXYTOCIN 20 UNITS: 10 INJECTION, SOLUTION INTRAMUSCULAR; INTRAVENOUS at 03:38

## 2023-07-07 RX ADMIN — PRENATAL WITH FERROUS FUM AND FOLIC ACID 1 TABLET: 3080; 920; 120; 400; 22; 1.84; 3; 20; 10; 1; 12; 200; 27; 25; 2 TABLET ORAL at 10:27

## 2023-07-07 RX ADMIN — SODIUM CHLORIDE, POTASSIUM CHLORIDE, SODIUM LACTATE AND CALCIUM CHLORIDE: 600; 310; 30; 20 INJECTION, SOLUTION INTRAVENOUS at 01:38

## 2023-07-07 RX ADMIN — BUPIVACAINE HYDROCHLORIDE 3 ML: 2.5 INJECTION, SOLUTION EPIDURAL; INFILTRATION; INTRACAUDAL at 03:10

## 2023-07-07 RX ADMIN — OXYTOCIN 125 ML/HR: 10 INJECTION, SOLUTION INTRAMUSCULAR; INTRAVENOUS at 05:40

## 2023-07-07 RX ADMIN — IBUPROFEN 800 MG: 800 TABLET, FILM COATED ORAL at 10:27

## 2023-07-07 ASSESSMENT — EDINBURGH POSTNATAL DEPRESSION SCALE (EPDS)
I HAVE FELT SAD OR MISERABLE: NO, NOT AT ALL
THE THOUGHT OF HARMING MYSELF HAS OCCURRED TO ME: NEVER
THINGS HAVE BEEN GETTING ON TOP OF ME: NO, MOST OF THE TIME I HAVE COPED QUITE WELL
I HAVE BEEN ABLE TO LAUGH AND SEE THE FUNNY SIDE OF THINGS: AS MUCH AS I ALWAYS COULD
I HAVE BLAMED MYSELF UNNECESSARILY WHEN THINGS WENT WRONG: NOT VERY OFTEN
I HAVE BEEN SO UNHAPPY THAT I HAVE HAD DIFFICULTY SLEEPING: YES, SOMETIMES
I HAVE FELT SCARED OR PANICKY FOR NO GOOD REASON: NO, NOT AT ALL
I HAVE BEEN ANXIOUS OR WORRIED FOR NO GOOD REASON: NO, NOT AT ALL
I HAVE BEEN SO UNHAPPY THAT I HAVE BEEN CRYING: NO, NEVER
I HAVE LOOKED FORWARD WITH ENJOYMENT TO THINGS: AS MUCH AS I EVER DID

## 2023-07-07 ASSESSMENT — PAIN DESCRIPTION - PAIN TYPE: TYPE: ACUTE PAIN

## 2023-07-07 ASSESSMENT — PAIN SCALES - GENERAL: PAIN_LEVEL: 0

## 2023-07-07 NOTE — PROGRESS NOTES
07/07/23 12:21 AM    S: Pt is comfortable in bed with epidural infusing. Discussed R/B/A of AROM - pt agreeable to AROM.    O:    Vitals:    07/06/23 2340 07/06/23 2345 07/06/23 2350 07/06/23 2355   BP: 128/59 125/60 120/59 121/62   Pulse: 91 86 87 85   Resp:       Temp:       TempSrc:       SpO2: 92% 93% 93% 93%   Weight:       Height:               FHTs: Baseline 130 BPM, absent accels, + intermittent early and late decels, moderate variability        [place]: Contractions q 2-3 minutes, pitocin @ 6 milliunits        SVE: 7/80/-2, AROM attempted - amniotic membranes tight across fetal head - unable to AROM even with ctx     A/P:    IUP @ 39w1d   Cat 2 FHTs    Labor progress: active labor  Plan: Recheck SVE when indicated, attempt AROM when indicated, frequent position changes  Consult Dr. Rosas (attending MD) MADI Lopez CNM

## 2023-07-07 NOTE — PROGRESS NOTES
0655: Report received from DEBBIE Amor. POC discussed.   0830: Pt and infant transported to postpartum in stable condition. Pt has all personal belongings. Report given to DEBBIE Gonzalez. Care relinquished.

## 2023-07-07 NOTE — PROGRESS NOTES
1900- report received from Jennifer LEWIS  2200- Florina CHARLTON bedside to perform SVE and discuss POC; recommended AROM at this time, pt declines; recommended to get epidural prior to AROM when pt is ready, pt agrees and expressed understanding   0000- Florina CHARLTON attempted AROM but amniotic membranes tight across fetal head, CNM suspects possible SROM prior to exam, will continue to monitor  0108- Pt called out with c/o numbness in face, mouth and hands as well as difficulty breathing. This RN positioned pt in high fowlers and called anesthesia to bedside at 0109 and ordered to stop epidural infusion. Pt placed on pulse ox and O2 sat was resting at 95% with occasional drops below 95%. O2 applied to pt via oxymask at 6L. Nidhi FENTON proceeded to bedside to assess pt. Pt then reports improvement in condition and O2 discontinued. Nidhi FENTON ordered to leave the epidural off at this time.   0200- Florina CHARLTON bedside to perform SVE and apply FSE. Pt confirmed to have ROM at unknown time and fluid was noted to be scant, blood tinged with clots.  0230- Nidhi FENTON updated on pt condition, ordered to restart epidural infusion at a rate of 6ml/hr  0307- Nidhi FENTON called to bedside for pain  0313- Florina CHARLTON called to bedside for delivery  0700- report given to Rosio LEWIS

## 2023-07-07 NOTE — ANESTHESIA PREPROCEDURE EVALUATION
Date: 23  Procedure: Labor Epidural        @ 39w, IUP, Holly    Relevant Problems   CARDIAC   (positive) Gestational hypertension      OB   (positive) Gestational diabetes mellitus (GDM) in third trimester   (positive) Gestational hypertension       Physical Exam    Airway   Mallampati: II  TM distance: >3 FB  Neck ROM: full       Cardiovascular - normal exam  Rhythm: regular  Rate: normal  (-) murmur     Dental - normal exam           Pulmonary - normal exam  Breath sounds clear to auscultation     Abdominal    Neurological - normal exam                 Anesthesia Plan    ASA 2       Plan - epidural   Neuraxial block will be labor analgesia                  Pertinent diagnostic labs and testing reviewed    Informed Consent:    Anesthetic plan and risks discussed with patient.

## 2023-07-07 NOTE — L&D DELIVERY NOTE
Delivery Note for Vaginal Delivery     Stage 1: 34 y/o  at 39w1d weeks admitted for medically indicated induction of labor for GDMA1 . Her antepartum course has been complicated by GDMA1, GHTN. Her labor progressed with cervical ripening, Pitocin, and AROM.  GBS negative. Fetal monitoring during labor was overall category II. Patient had epidural analgesia for pain control.     Stage II: At 0312, she was found to be C/C/+1. She pushed for 14 minutes with exceptional pushing efforts to deliver a viable male infant on 2023 at 0335. The head delivered in SAMEER and spontaneously restituted to ROT. The left anterior shoulder delivered easily with gentle downward guidance followed by the right posterior shoulder and body in rapid progression. A nuchal cord was not present. The infant was placed immediately skin-to-skin upon mother’s abdomen where he was dried and stimulated. A spontaneous cry was noted. Delayed cord clamping was initiated until cessation of pulsations (>120 seconds). The cord was then doubly clamped and cut by this CNM. APGARS 8 and 9 at one and five minutes respectively. Infant weight is pending d/t skin to skin with mother.    Stage III: Attention was turned to active management of the third stage. IV Pitocin initiated via protocol. Signs of placental separation were noted and it delivered spontaneously and intact via garcia with gentle cord traction with countertraction maintained suprapubically. The umbilical cord insertion was velamentous with a 3VC. With massage, fundus was firm at U with scant lochia.     Laceration repair: Inspection of the vulva, perineum, vagina, and cervix revealed a 1st degree perineal. It was hemostatic, well-approximated, and did not require repair.    Estimated blood loss was 400 mL.     All questions were answered. Routine postpartum care was initiated as the mother and infant in stable condition and bonding.     All table counts were correct at the close of the  procedure.    Delivery was attended by Florina Lopez C.N.M.     Dr. Rosas was the attending MD.

## 2023-07-07 NOTE — ANESTHESIA PROCEDURE NOTES
Epidural Block    Date/Time: 7/6/2023 10:24 PM    Performed by: Nestor Terrell D.O.  Authorized by: Nestor Terrell D.O.    Patient Location:  OB  Start Time:  7/6/2023 10:24 PM  End Time:  7/6/2023 10:30 PM  Reason for Block: labor analgesia    patient identified, IV checked, site marked, risks and benefits discussed, surgical consent, monitors and equipment checked and pre-op evaluation    Patient Position:  Sitting  Prep: ChloraPrep, patient draped and sterile technique    Monitoring:  Blood pressure, continuous pulse oximetry and heart rate  Approach:  Midline  Location:  L2-L3  Injection Technique:  YON air  Skin infiltration:  Lidocaine  Strength:  1%  Dose:  3ml  Needle Type:  Tuohy  Needle Gauge:  17 G  Needle Length:  3.5 in  Loss of resistance::  5  Catheter Size:  19 G  Catheter at Skin Depth:  9  Test Dose Result:  Negative

## 2023-07-07 NOTE — ANESTHESIA TIME REPORT
Anesthesia Start and Stop Event Times     Date Time Event    7/6/2023 2318 Anesthesia Start    7/7/2023 0335 Anesthesia Stop        Responsible Staff  07/06/23 to 07/07/23    Name Role Begin End    Nestor Terrell D.O. Anesth [id number]        Overtime Reason:  no overtime (within assigned shift)    Comments:

## 2023-07-07 NOTE — PROGRESS NOTES
07/07/23 2:34 AM    S: Pt did experience circumoral and bilateral hand numbness. Anesthesia was called to the bedside and epidural was turned off. Anesthesia to bolus with controlled doses PRN.    Per RN, Pitocin was unable to be titrated up as the RN and anesthesia were managing the numbness. Discussed R/B/A of FSE placement - pt agreeable to plan.    O:    Vitals:    07/06/23 2340 07/06/23 2345 07/06/23 2350 07/06/23 2355   BP: 128/59 125/60 120/59 121/62   Pulse: 91 86 87 85   Resp:       Temp:       TempSrc:       SpO2: 92% 93% 93% 93%   Weight:       Height:               FHTs: Baseline 125 BPM, absent accels, + decels - difficult to determine (RN to adjust toco), minimal variability with periods of moderate variability        [place]: Contractions q 2-3 minutes, pitocin @ 8 milliunits        SVE: 7/80/-2, FSE placed, pt's amniotic membranes have ruptured  - unsure whether spontaneous or artificial; amniotic fluid is blood tinged     A/P:    IUP @ 39w1d   Cat 2 FHTs    Labor progress: active labor, now with ruptured membranes  Plan: Continue Pitocin, frequent position changes with use of peanut ball, 500 mL IV bolus, recheck SVE when indicated  Consult Dr. Rosas (attending MD) MADI Lopez CNM

## 2023-07-07 NOTE — CARE PLAN
The patient is Stable - Low risk of patient condition declining or worsening    Shift Goals  Clinical Goals: vss, lochia WNL, adequate pain  Patient Goals: Healthy mom and baby, bonding, recovery  Family Goals: Support      Problem: Psychosocial - Postpartum  Goal: Patient will verbalize and demonstrate effective bonding and parenting behavior  Outcome: Progressing  Note: MOB has shown adequate bonding with infant, provided skin to skin, and proper cares of infant.       Problem: Knowledge Deficit - Postpartum  Goal: Patient will verbalize and demonstrate understanding of self and infant care  Outcome: Progressing  Note: MOB demonstrates and verbalizes understanding on how to care for . Asks appropriate questions and calls for help when necessary. Education has been provided to patient.

## 2023-07-07 NOTE — CARE PLAN
The patient is Watcher - Medium risk of patient condition declining or worsening    Shift Goals  Clinical Goals: labor progress  Patient Goals: pain control  Family Goals: support    Progress made toward(s) clinical / shift goals:    Problem: Knowledge Deficit - L&D  Goal: Patient and family/caregivers will demonstrate understanding of plan of care, disease process/condition, diagnostic tests and medications  Outcome: Progressing     Problem: Psychosocial - L&D  Goal: Patient will be able to discuss coping skills during hospitalization  Outcome: Progressing     Problem: Risk for Infection and Impaired Wound Healing  Goal: Patient will remain free from infection  Outcome: Progressing     Problem: Risk for Injury  Goal: Patient and fetus will be free of preventable injury/complications  Outcome: Progressing     Problem: Pain  Goal: Patient's pain will be alleviated or reduced to the patient’s comfort goal  Outcome: Progressing

## 2023-07-07 NOTE — ANESTHESIA POSTPROCEDURE EVALUATION
Patient: Lillian Wesley    Procedure Summary     Date: 07/06/23 Room / Location:     Anesthesia Start: 2318 Anesthesia Stop: 07/07/23 0335    Procedure: Labor Epidural Diagnosis:     Scheduled Providers:  Responsible Provider: Nestor Terrell D.O.    Anesthesia Type: epidural ASA Status: 2          Final Anesthesia Type: epidural  Last vitals  BP   Blood Pressure: (!) 141/81    Temp   37 °C (98.6 °F)    Pulse   (!) 104   Resp   16    SpO2   97 %      Anesthesia Post Evaluation    Patient location during evaluation: floor  Patient participation: complete - patient participated  Level of consciousness: awake and alert  Pain score: 0    Airway patency: patent  Anesthetic complications: no  Cardiovascular status: hemodynamically stable  Respiratory status: acceptable  Hydration status: euvolemic    PONV: none          No notable events documented.

## 2023-07-07 NOTE — PROGRESS NOTES
"07/06/23 8:00 PM    S: Pt is comfortable, resting in bed. Accompanied by Cherelle, her daughter, who is at the bedside and supportive.     O:    Vitals:    07/06/23 0925 07/06/23 1411 07/06/23 1749   BP: 133/71 129/61 (!) 142/80   Pulse: 88 89 87   Resp: 16     Temp: 36.9 °C (98.5 °F) 36.7 °C (98 °F) 36.5 °C (97.7 °F)   TempSrc: Temporal Temporal Temporal   Weight: 93.9 kg (207 lb)     Height: 1.575 m (5' 2\")             FHTs: Baseline 130 BPM, + accels, absent decels, moderate variability        [place]: Contractions q 2-6 minutes, pitocin @ 6 milliunits        SVE: deferred    A/P:    IUP @ 39w0d   Cat 1 FHTs    Labor progress: 1st stage labor  Plan: Continue Pitocin, consider AROM, recheck SVE when indicated  Consult Dr. Rosas (attending MD) MADI Lopez CNM      _______________________________________________    07/06/23 10:16 PM    S: Pt is feeling more uncomfortable with ctx. Continues to be accompanied by her daughter. Discussed R/B/A of SVE and AROM - pt agreeable to plan if safe.    O:    Vitals:    07/06/23 0925 07/06/23 1411 07/06/23 1749   BP: 133/71 129/61 (!) 142/80   Pulse: 88 89 87   Resp: 16     Temp: 36.9 °C (98.5 °F) 36.7 °C (98 °F) 36.5 °C (97.7 °F)   TempSrc: Temporal Temporal Temporal   Weight: 93.9 kg (207 lb)     Height: 1.575 m (5' 2\")             FHTs: Baseline 130 BPM, absent accels, absent decels, minimal variability with periods of moderate variability        [place]: Contractions q 2-3 minutes,  pitocin @ 6 milliunits        SVE: 6/80/-2, soft, mid-position     A/P:    IUP @ 39w0d   Cat 2 FHTs    Labor progress: active labor  Pt agreeable to AROM but desires to get epidural prior.  Plan: Continue Pitocin, AROM after epidural placement, recheck SVE when indicated  Consult Dr. Rosas (attending MD) MADI Lopez CNM      "

## 2023-07-07 NOTE — DISCHARGE PLANNING
Discharge Planning Assessment Post Partum    Reason for Referral: FOB not involved  Address: [address] [place] [address] [place] [address]  Phone: [phone]  Type of Living Situation: living with 13 year old daughter  Mom Diagnosis: Pregnancy  Baby Diagnosis: -39.1 weeks  Primary Language: Slovak speaking.  Offered to use  services, however Pt preferred to have daughter translate    Name of Baby: Vineet Wesley (: 23)  Father of the Baby: Not involved   Involved in baby’s care? No  Contact Information: N/A    [facility]: [facility]  Mom's PCP: No PCP listed  PCP for new baby: Pediatrician list provided to mother    Support System: MOB's daughter   Coping/Bonding between mother & baby: Yes  Source of Feeding: breast and formula feeding  Supplies for Infant: prepared for baby; denies any needs    Mom's Insurance: [place] Medicaid  Baby Covered on Insurance:Yes  Mother Employed/School: Not currently  Other children in the home/names & ages: 13 year old daughter    Financial Hardship/Income: No   Mom's Mental status: alert and oriented  Services used prior to admit: Medicaid, WIC, and food stamps    CPS History: No  Psychiatric History: No  Domestic Violence History: No  Drug/ETOH History: No    Resources Provided: pediatrician list, children and family resource list, diaper bank assistance resources, and post partum support and counseling resources provided  Referrals Made: diaper bank referral provided     Clearance for Discharge: Infant is cleared to discharge home with mother once medically cleared

## 2023-07-07 NOTE — PROGRESS NOTES
0830 Received report from Rosio L&ZAN RN. Orientated patient to room, call light and emergency light education provided. Assessment completed, fundus firm, lochia scant, generalized edema present. Plan of care reviewed, patient verbalized understanding. C/O pain 2/10    0900- patient request to give infant formula education provided q3h feeds, formula guidelines provided MOB verbalized understanding.

## 2023-07-08 ENCOUNTER — PHARMACY VISIT (OUTPATIENT)
Dept: PHARMACY | Facility: MEDICAL CENTER | Age: 33
End: 2023-07-08
Payer: COMMERCIAL

## 2023-07-08 VITALS
BODY MASS INDEX: 38.09 KG/M2 | RESPIRATION RATE: 16 BRPM | HEIGHT: 62 IN | WEIGHT: 207 LBS | TEMPERATURE: 97.6 F | HEART RATE: 82 BPM | DIASTOLIC BLOOD PRESSURE: 75 MMHG | OXYGEN SATURATION: 96 % | SYSTOLIC BLOOD PRESSURE: 125 MMHG

## 2023-07-08 PROCEDURE — A9270 NON-COVERED ITEM OR SERVICE: HCPCS

## 2023-07-08 PROCEDURE — 99999 PR NO CHARGE: CPT | Performed by: OBSTETRICS & GYNECOLOGY

## 2023-07-08 PROCEDURE — 700102 HCHG RX REV CODE 250 W/ 637 OVERRIDE(OP)

## 2023-07-08 PROCEDURE — RXMED WILLOW AMBULATORY MEDICATION CHARGE

## 2023-07-08 RX ORDER — ACETAMINOPHEN 500 MG
1000 TABLET ORAL EVERY 6 HOURS PRN
Qty: 30 TABLET | Refills: 0 | Status: SHIPPED | OUTPATIENT
Start: 2023-07-08

## 2023-07-08 RX ORDER — IBUPROFEN 800 MG/1
800 TABLET ORAL EVERY 8 HOURS PRN
Qty: 30 TABLET | Refills: 0 | Status: SHIPPED | OUTPATIENT
Start: 2023-07-08

## 2023-07-08 RX ORDER — VITAMIN A ACETATE, BETA CAROTENE, ASCORBIC ACID, CHOLECALCIFEROL, .ALPHA.-TOCOPHEROL ACETATE, DL-, THIAMINE MONONITRATE, RIBOFLAVIN, NIACINAMIDE, PYRIDOXINE HYDROCHLORIDE, FOLIC ACID, CYANOCOBALAMIN, CALCIUM CARBONATE, FERROUS FUMARATE, ZINC OXIDE, CUPRIC OXIDE 3080; 12; 120; 400; 1; 1.84; 3; 20; 22; 920; 25; 200; 27; 10; 2 [IU]/1; UG/1; MG/1; [IU]/1; MG/1; MG/1; MG/1; MG/1; MG/1; [IU]/1; MG/1; MG/1; MG/1; MG/1; MG/1
1 TABLET, FILM COATED ORAL DAILY
Qty: 30 TABLET | Refills: 6 | Status: SHIPPED | OUTPATIENT
Start: 2023-07-08

## 2023-07-08 RX ORDER — PSEUDOEPHEDRINE HCL 30 MG
100 TABLET ORAL 2 TIMES DAILY PRN
Qty: 60 CAPSULE | Refills: 0 | Status: SHIPPED | OUTPATIENT
Start: 2023-07-08

## 2023-07-08 RX ADMIN — ACETAMINOPHEN 1000 MG: 500 TABLET, FILM COATED ORAL at 02:39

## 2023-07-08 RX ADMIN — PRENATAL WITH FERROUS FUM AND FOLIC ACID 1 TABLET: 3080; 920; 120; 400; 22; 1.84; 3; 20; 10; 1; 12; 200; 27; 25; 2 TABLET ORAL at 11:23

## 2023-07-08 ASSESSMENT — PATIENT HEALTH QUESTIONNAIRE - PHQ9
2. FEELING DOWN, DEPRESSED, IRRITABLE, OR HOPELESS: NOT AT ALL
SUM OF ALL RESPONSES TO PHQ9 QUESTIONS 1 AND 2: 0
1. LITTLE INTEREST OR PLEASURE IN DOING THINGS: NOT AT ALL

## 2023-07-08 ASSESSMENT — PAIN DESCRIPTION - PAIN TYPE: TYPE: ACUTE PAIN

## 2023-07-08 NOTE — LACTATION NOTE
This note was copied from a baby's chart.  Lillian is breast feeding and supplementing with formula. She did this with her previous child. She has WIC services and knows to call them on Monday to notify of baby's birth.

## 2023-07-08 NOTE — DISCHARGE INSTRUCTIONS
DISCHARGE INSTRUCTIONS (Italian) POSTPARTUM FOR MOM      PANKAJ LAS DIANNE:  Antes de tocar el bebé.  Antes del amamantamiento o darle al bebé lactancia artificial.  Después de usar el baño.    CUIDADO DE LAS HERIDAS:  La Incisión de la Operación Cesárea:  Mantenga limpea y seca, NO levante nada que pesa más que granger bebé por 6 semenas.  No debe ninguna apertura ni pus.  Episiotomía/Raudel Vaginal:  Use un spray de Tucks o Dermoplast, tome un baño de asiento cuando necesite (6 pulgadas), siga usando la botella Jerica hasta que pare de sangrar.    CUIDADA DEL SENO:  Lleve un sostén.  Si esta` amamantando no use jabón en el seno ni en los pezones.  Aplique lanolina si los pezones se ponen quebrazados y si le duelen.    CUIDADO DE LA VAGINA:  [place] puede entrar la vagina por 6 semanas:  Actividad sexual, Ducha vaginal, Tampones.  El sangramiento puede seguir por 2 a 4 semanas.  La cantidad es variable.  Llame a granger med`ico(a) obstetra si hay mucha david (si usa ma`s de lake toalla femenina cada hora).    CONTRACEPCIÒN:  Es posible embarazarse en cualquier tiempo despus del parto y mientras el amamantamiento.  Debe planear de discutir los metodos de cantracepción con granger médico(a) cuando vuelva en 6 semanas para granger revisión médica.    DIETA Y DEFECACÒN:  Para evitar la constipación coma mas fibra (cereal salvado, fruta, y verduras) Y tome muchos liquidos.   Es común orinar frecuentemente después del parto.    POSTPARTO Y LA DEPRESÒN:  Sagrario los primeros ceja después del parto es común sentirse:  Impaciente, irritable, o llorar  Estos sentimientos llegan y van rapidamente.  No obstante, kam lake de cada tony mujeres tiene síntomas emocionales conocidos ashish depresión postparto.  Despresión Postparto:  Puede empezar tan pronto ashish el faheem o tercer día después del parto o puede durar algunas semanas o meses para desarrollar.  Síntomas de la depresión pueden presentarse, jaxson son más intensos:  Pérdida del hambre  Frecuencia  de llorar  Sentirse desesperada o perder el control  Demasiada o no suficiente preocupación con granger bebé  Tener miedo de tocar el bebé  No preocuparse con granger propia apariencia  Incapacidad de dormir o dormir excesivamente    DEPRESIÒN / RIESGO AL SUICIDIO:    Cuando se le da de cindy de alguna entidad de [facility], es importante aprender a mantener a elias de hacerse daño a sí mismo.    Reconocer los signos de advertencia:  Cambios bruscos en la peronalidad, positiva o negativa, incluyendo aumento de la energia  Regalar posesiones  Cambios en patrones de comer - significativos cambios de peso - positivos o negativos  Cambio de patrones para dormir - no poder dormir o dormir todo el tiempo  Falta de voluntad o incapacidad de comunicarse  Depresión  Cyndi, desánimo y tristeza inusual  Habla de querer morir  Descuido del aspecto personal  Rebeldía - comportamiento imprudente  Retiro de personas y actividades que les gusta  Confusión-incapacidad para concentrarse    Si usted o un ser querido observa cualquiera de estos comportamientos o tiene preocupaciones de hacerse daño a si mismo, aquí le damos lo que usted puede hacer:  Hablar de ti - tus sentimientos y razones para hacerse shayne a si mismo  Retire cualquier medio que se podría utilizar para lastimarse (ejemplos: píldoras, cuerdas, cordones de extensión, arma de josseline)  Obtenga ayuda profesional de la comunidad (samy mental, abuso de sustancias, orientación psicológica)  No estar solo: llamar a un contacto seguro - lake persona que confía en que estará allí por usted  Llamada local L´INEA de CRISIS [phone] y [phone]  Llamada [facility] [employer] Para Crisis de [place] [phone] or [email or web address]  Llamada gratuita nacional, líneas de prevención del suicidio  Preventión del Suicidio Nacional [facility] [phone] ([phone])  L[place] Nacional de la [place] [phone])       ([employer]  Links)

## 2023-07-08 NOTE — CARE PLAN
The patient is Stable - Low risk of patient condition declining or worsening    Shift Goals  Clinical Goals: stable vs, feed baby q2-3 hours, lochia wdl  Patient Goals: bond with baby  Family Goals: support    Progress made toward(s) clinical / shift goals:      Problem: Pain - Standard  Goal: Alleviation of pain or a reduction in pain to the patient’s comfort goal  Outcome: Progressing  Note: Pt reported of lower abdomen pain. Medicated per MAR for pain. Pt reported pain tolerable after pain medication administration. Pain reassessed in 2 hours.      Problem: Knowledge Deficit - Postpartum  Goal: Patient will verbalize and demonstrate understanding of self and infant care  Outcome: Progressing  Note: Plan of care and medications discussed and reviewed over with pt. Reviewed over frequency and how much formula to feed baby, breastfeeding, reducing leg swelling, burping baby, baby's vs and blood sugar checks using via      Problem: Psychosocial - Postpartum  Goal: Patient will verbalize and demonstrate effective bonding and parenting behavior  Outcome: Progressing  Note: Pt demonstrated effective parenting and bonding behavior by attending to baby's needs, changing diapers, and feeding baby q2-3 hours.     Problem: Altered Physiologic Condition  Goal: Patient physiologically stable as evidenced by normal lochia, palpable uterine involution and vitals within normal limits  Outcome: Progressing  Note: Pt fundus is firm, bleeding is scant and lochia is stable

## 2023-07-08 NOTE — DISCHARGE SUMMARY
Discharge Summary:      Lillian Wesley    Admit Date:   2023  Discharge Date:  2023     Admitting diagnosis:  Pregnancy [Z34.90]  Labor and delivery indication for care or intervention [O75.9]  Discharge Diagnosis: Status post vaginal, spontaneous.  Pregnancy Complications: gestational diabetes - diet control; gHTN  Tubal Ligation:  no      History:  History reviewed. No pertinent past medical history.  OB History    Para Term  AB Living   2 2 2     2   SAB IAB Ectopic Molar Multiple Live Births           0 2      # Outcome Date GA Lbr Matthew/2nd Weight Sex Delivery Anes PTL Lv   2 Term 23 39w1d / 00:23 2.865 kg (6 lb 5.1 oz) M Vag-Spont EPI N MOISES   1 Term 05/19/10    F Vag-Spont   MOISES      Birth Comments: patient does not remember birth weight      Patient has no known allergies.  Patient Active Problem List    Diagnosis Date Noted    Labor and delivery indication for care or intervention 2023    Gestational hypertension 2023    Gestational diabetes mellitus (GDM) in third trimester 2023    Cold sore 2023    Elevated glucose tolerance test 2023    Encounter for supervision of normal pregnancy in third trimester 2023    Acanthosis nigricans 2023      Hospital Course:   33 y.o. , now para 2, was admitted with the above mentioned diagnosis, underwent Induction of Labor, vaginal, spontaneous. Patient postpartum course was unremarkable, with progressive advancement in diet , ambulation and toleration of oral analgesia. Patient without complaints today and desires discharge.      Vitals:    23 1800 23 2200 23 0200 23 0600   BP: 123/79 129/86 (!) 142/82 125/75   Pulse: 86 94 89 82   Resp: 18 20 16 16   Temp: 36.2 °C (97.2 °F) 37.1 °C (98.7 °F) 36.4 °C (97.6 °F) 36.4 °C (97.6 °F)   TempSrc: Temporal Temporal Temporal Temporal   SpO2: 98% 97% 97% 96%   Weight:       Height:           Current Facility-Administered Medications    Medication Dose    ropivacaine 0.2 % (Naropin) injection      lactated ringers infusion      docusate sodium (Colace) capsule 100 mg  100 mg    ibuprofen (Motrin) tablet 800 mg  800 mg    acetaminophen (Tylenol) tablet 1,000 mg  1,000 mg    PRN oxytocin (PITOCIN) (20 Units/1000 mL) PRN for excessive uterine bleeding - See Admin Instr  125-999 mL/hr    miSOPROStol (Cytotec) tablet 600 mcg  600 mcg    carboPROST (Hemabate) injection 250 mcg  250 mcg    NIFEdipine IR (Procardia) capsule 10 mg  10 mg    prenatal plus vitamin (Stuartnatal 1+1) 27-1 MG tablet 1 Tablet  1 Tablet    LR infusion      oxytocin (Pitocin) infusion (for post delivery)  125 mL/hr    oxytocin (Pitocin) 0.02 Units/mL LR (induction of labor)  0.5-20 archie-units/min     Exam:  Breast Exam: negative  Abdomen: Abdomen soft, non-tender. BS normal. No masses,  No organomegaly  Fundus Non Tender: yes  Incision: none  Perineum: perineum intact  Extremity: extremities, peripheral pulses and reflexes normal     Labs:  Recent Labs     07/06/23  1000 07/07/23  1422   WBC 9.2 12.8*   RBC 4.07* 3.85*   HEMOGLOBIN 11.2* 10.6*   HEMATOCRIT 34.4* 32.9*   MCV 84.5 85.5   MCH 27.5 27.5   MCHC 32.6 32.2   RDW 49.6 50.9*   PLATELETCT 219 187   MPV 10.7 10.6      Activity:   Discharge to home  Pelvic Rest x 6 weeks    Assessment:  normal postpartum course  BCM: declines method, has no partner at this time  Discharge Assessment: Taking adequate diet and fluids, no heavy bleeding or foul discharge, breasts without evidence of infection/concerns, voiding without difficulty      Follow up: [facility] in 5 weeks for postpartum ; 3-5 days for BP check    Reviewed with Lillian the need to call or go to ED for any of the following:  - Fever over 100.5  - Severe abdominal pain  - Severe pain or swelling of laceration or incinsion site  - Red streaks or painful masses in the breasts  - Foul smelling discharge or lochia  - Heavy vaginal bleeding saturating a pad per  hour or passing golf ball sized clots  - Sxs of PP depression  - Severe unremitting headache or visual changes        Discharge Meds:   Current Outpatient Medications   Medication Sig Dispense Refill    acetaminophen (TYLENOL) 500 MG Tab Take 2 Tablets by mouth every 6 hours as needed for Mild Pain or Moderate Pain. 30 Tablet 0    docusate sodium 100 MG Cap Take 100 mg by mouth 2 times a day as needed for Constipation. 60 Capsule 0    ibuprofen (MOTRIN) 800 MG Tab Take 1 Tablet by mouth every 8 hours as needed for Mild Pain or Moderate Pain. 30 Tablet 0    prenatal plus vitamin (STUARTNATAL 1+1) 27-1 MG Tab tablet Take 1 Tablet by mouth every day. 30 Tablet 6       MARCO TripathiNGENARO  Attending: Dr. Dinh

## 2023-07-08 NOTE — CARE PLAN
The patient is Stable - Low risk of patient condition declining or worsening    Shift Goals  Clinical Goals: Patient will maintain stable vital signs and feed baby.  Patient Goals: bond with baby  Family Goals: support    Pt is able to maintain stable vital signs throughout stay and feed baby with breast and bottle.     Progress made toward(s) clinical / shift goals:    Problem: Knowledge Deficit - L&D  Goal: Patient and family/caregivers will demonstrate understanding of plan of care, disease process/condition, diagnostic tests and medications  Outcome: Met     Problem: Risk for Excess Fluid Volume  Goal: Patient will demonstrate pulse, blood pressure and neurologic signs within expected ranges and without any respiratory complications  Outcome: Met     Problem: Psychosocial - L&D  Goal: Patient's level of anxiety will decrease  Outcome: Met  Goal: Patient will be able to discuss coping skills during hospitalization  Outcome: Met  Goal: Patient's ability to re-evaluate and adapt role responsibilities will improve  Outcome: Met  Goal: Spiritual and cultural needs incorporated into hospitalization  Outcome: Met     Problem: Risk for Venous Thromboembolism (VTE)  Goal: VTE prevention measures will be implemented and patient will remain free from VTE  Outcome: Met     Problem: Risk for Infection and Impaired Wound Healing  Goal: Patient will remain free from infection  Outcome: Met  Goal: Patient's wound/surgical incision will decrease in size and heals properly  Outcome: Met     Problem: Risk for Fluid Imbalance  Goal: Patient's fluid volume balance will be maintained or improve  Outcome: Met     Problem: Risk for Injury  Goal: Patient and fetus will be free of preventable injury/complications  Outcome: Met     Problem: Pain  Goal: Patient's pain will be alleviated or reduced to the patient’s comfort goal  Outcome: Met     Problem: Discharge Barriers/Planning  Goal: Patient's continuum of care needs are met  Outcome:  Met     Problem: Pain - Standard  Goal: Alleviation of pain or a reduction in pain to the patient’s comfort goal  Outcome: Met     Problem: Knowledge Deficit - Standard  Goal: Patient and family/care givers will demonstrate understanding of plan of care, disease process/condition, diagnostic tests and medications  Outcome: Met     Problem: Knowledge Deficit - Postpartum  Goal: Patient will verbalize and demonstrate understanding of self and infant care  Outcome: Met     Problem: Psychosocial - Postpartum  Goal: Patient will verbalize and demonstrate effective bonding and parenting behavior  Outcome: Met     Problem: Altered Physiologic Condition  Goal: Patient physiologically stable as evidenced by normal lochia, palpable uterine involution and vitals within normal limits  Outcome: Met     Problem: Infection - Postpartum  Goal: Postpartum patient will be free of signs and symptoms of infection  Outcome: Met     Problem: Respiratory/Oxygenation Function Post-Surgical  Goal: Patient will achieve/maintain normal respiratory rate/effort  Outcome: Met     Problem: Bowel Elimination - Post Surgical  Goal: Patient will resume regular bowel sounds and function with no discomfort or distention  Outcome: Met     Problem: Early Mobilization - Post Surgery  Goal: Early mobilization post surgery  Outcome: Met

## 2023-07-08 NOTE — PROGRESS NOTES
Bedside report received from Lisa LEWIS. Assumed care of pt. Pt sitting on couch with infant in her arms. A/Ox4, VSS, and assessed pain. All needs met. POC reviewed and white board updated. Call light in reach. Bed locked in lowest position with 2 upper bed rails up.

## 2023-07-08 NOTE — PROGRESS NOTES
1045:  used. Assessment complete. POC reviewed. Patient has no complaints at this time. Pt has call light at bedside and knows to call for assistance as needed. Pt encouraged to void often and instructed on when to call for heavy vaginal bleeding. Pt also assisted with breast and bottle feeding. Education provided.     1600: Discharge instructions reviewed. Patients states she understood. Paperwork signed. Patient was taken out via wheel chair by staff.

## 2023-07-09 ENCOUNTER — HOSPITAL ENCOUNTER (EMERGENCY)
Facility: MEDICAL CENTER | Age: 33
End: 2023-07-09
Attending: OBSTETRICS & GYNECOLOGY | Admitting: OBSTETRICS & GYNECOLOGY
Payer: MEDICAID

## 2023-07-09 ENCOUNTER — HOSPITAL ENCOUNTER (EMERGENCY)
Facility: MEDICAL CENTER | Age: 33
End: 2023-07-09
Payer: MEDICAID

## 2023-07-09 VITALS
BODY MASS INDEX: 40.84 KG/M2 | HEART RATE: 100 BPM | TEMPERATURE: 97.8 F | OXYGEN SATURATION: 98 % | SYSTOLIC BLOOD PRESSURE: 136 MMHG | HEIGHT: 60 IN | DIASTOLIC BLOOD PRESSURE: 61 MMHG | WEIGHT: 208 LBS | RESPIRATION RATE: 16 BRPM

## 2023-07-09 PROCEDURE — 99999 PR NO CHARGE: CPT | Performed by: OBSTETRICS & GYNECOLOGY

## 2023-07-09 PROCEDURE — 302449 STATCHG TRIAGE ONLY (STATISTIC)

## 2023-07-09 ASSESSMENT — FIBROSIS 4 INDEX: FIB4 SCORE: .872871560943969525

## 2023-07-10 NOTE — DISCHARGE INSTRUCTIONS
Call or come to ED for: heavy vaginal bleeding, fever >100.4, severe abdominal pain, severe headache, chest pain, shortness of breath,  N/V, or other concerns.

## 2023-07-10 NOTE — ED PROVIDER NOTES
LABOR AND DELIVERY TRIAGE NOTE    PATIENT ID:  NAME:  Lillian Wesley  MRN:               [id number]  YOB: 1990     33 y.o. female  who delivered via  at 39w1d.    Subjective: Pt with increased swelling in her bilateral LE as well as abodminal swelling that has worsened since she was discharged after her delivery.  She has pain in her epidural site that radiates to her bilateral sides that is worsened with position changes. She has had some vision changes this morning that has now resolved. Denies headache or RUQ pain. Pt was admitted  for  and discharged .    Pregnancy complicated by GDM, GHTN and polyhydramnios       PNL:  Blood Type O+, Rubella immune, HIV neg, TrepAb neg, HBsAg NR, GC/CT neg/neg  1 HR GTT: 182  3 HR GTT: 76  GBS negative      ROS: Patient denies any fever chills, nausea, vomiting, headache, chest pain, shortness of breath, or dysuria or     PMHx:   No past medical history on file.     OB History    Para Term  AB Living   2 2 2     2   SAB IAB Ectopic Molar Multiple Live Births           0 2      # Outcome Date GA Lbr Matthew/2nd Weight Sex Delivery Anes PTL Lv   2 Term 23 39w1d / 00:23 2.865 kg (6 lb 5.1 oz) M Vag-Spont EPI N MOISES   1 Term 05/19/10    F Vag-Spont   MOISES      Birth Comments: patient does not remember birth weight       GYN: denies STIs, no cervical procedures    PSHx:  No past surgical history on file.    Social Hx:  Social History     Tobacco Use    Smoking status: Never   Vaping Use    Vaping Use: Never used   Substance Use Topics    Alcohol use: No    Drug use: Never         Medications:  No current facility-administered medications on file prior to encounter.     Current Outpatient Medications on File Prior to Encounter   Medication Sig Dispense Refill    acetaminophen (TYLENOL) 500 MG Tab Take 2 Tablets by mouth every 6 hours as needed for Mild Pain or Moderate Pain. 30 Tablet 0    docusate sodium 100 MG Cap Take 1 capsule by  mouth 2 times a day as needed for Constipation. 60 Capsule 0    ibuprofen (MOTRIN) 800 MG Tab Take 1 Tablet by mouth every 8 hours as needed for Mild Pain or Moderate Pain. 30 Tablet 0    prenatal plus vitamin (STUARTNATAL 1+1) 27-1 MG Tab tablet Take 1 Tablet by mouth every day. 30 Tablet 6       Allergies:  NKDA      Objective:    Vitals:    23 1722 23 1738 23 1756   BP: (!) 129/96 138/89 136/61   Pulse: (!) 102 (!) 101 100   Resp: 16     Temp: 36.6 °C (97.8 °F)     TempSrc: Oral     SpO2: 98%     Weight: 94.3 kg (208 lb)     Height: 1.524 m (5')       Temp (24hrs), Av.6 °C (97.8 °F), Min:36.6 °C (97.8 °F), Max:36.6 °C (97.8 °F)    General: No acute distress, resting comfortably in bed.  HEENT: normocephalic, nontraumatic, PERRLA, EOMI  Cardiovascular: Heart RRR with no murmurs, rubs or gallops. Distal Pulses 2+  Respiratory: symmetric chest expansion, lungs CTAB, with no wheezes, rales, rhonci  Abdomen: gravid, nontender  Musculoskeletal: LOPEZ spontaneously, 2+ pitting edema pretibial   Neuro: non focal with no numbness, tingling or changes in sensation, no hyperreflexia       Assessment:   33 y.o. , now para 2, was admitted and underwent Induction of Labor, vaginal, spontaneous. Patient postpartum course was unremarkable, with progressive advancement in diet , ambulation and toleration of oral analgesia. She was discharged on 23.    #Swelling  -pts swelling appears to be bilateral in her LE's   -Her BP is 129/96  -Her vision changes have resolved  -unlikely preE   -pt had cycled BP's (129/96, 138/89, 136/61)     - Patient is cleared to return home with family. Encouraged to see MD/DO for increased painful uterine contractions @ 3-5, vaginal bleeding, loss of fluid, or other serious symptoms.      Discussed case with Dr. Parish, [facility] Attending. Case was discussed and attending agreed with plan prior to discharge of patient.      Karen Flanagan, DO  PGY-1, [facility]

## 2023-07-10 NOTE — PROGRESS NOTES
", PP day 2. Pt presents with c/o increased swelling and numbness in legs and blurry vision. Pt states when she woke this morning her legs were swollen and objects appeared \"smaller\" than normal, though her vision has since returned to normal. She has experienced numbness in her right LE since the beginning of her pregnancy. Pt escorted to triage room and oriented to room and unit.  2+ generalized edema in LE bilat, DTRs 2+ bilat, no clonus. Pt denies HA or epigastric pain.    174 Dr. Flanagan at bedside, POC discussed with pt.    1800 Steffanie Parish and Masha at bedside, POC disussed with pt. D/C order received. D/C instructions discussed with pt and family while still on interpretor, pt reports understanding.    182 Pt d/c to self with family.  "

## 2023-07-19 ENCOUNTER — TELEPHONE (OUTPATIENT)
Dept: OBGYN | Facility: CLINIC | Age: 33
End: 2023-07-19
Payer: MEDICAID

## 2023-07-19 NOTE — TELEPHONE ENCOUNTER
7/19/23 @ 10:33AM :     - ID : [id number]    Called pt to ask about the [facility] paperwork that we received on 6/26/23. Was following up on when she will be able to provide us with the portion of the paperwork we are responsible for filling out. The papers she provided were for her and her employer to complete, no documents provided for [facility] to complete. Pt said she can get us the paperwork in about 1 week and will bring into the clinic in person. I let her know once I receive it I will try to get it completed as soon as possible. Pt understood. No further questions.

## 2024-01-09 ENCOUNTER — HOSPITAL ENCOUNTER (EMERGENCY)
Facility: MEDICAL CENTER | Age: 34
End: 2024-01-09
Attending: STUDENT IN AN ORGANIZED HEALTH CARE EDUCATION/TRAINING PROGRAM
Payer: MEDICAID

## 2024-01-09 ENCOUNTER — APPOINTMENT (OUTPATIENT)
Dept: RADIOLOGY | Facility: MEDICAL CENTER | Age: 34
End: 2024-01-09
Attending: STUDENT IN AN ORGANIZED HEALTH CARE EDUCATION/TRAINING PROGRAM
Payer: MEDICAID

## 2024-01-09 VITALS
DIASTOLIC BLOOD PRESSURE: 81 MMHG | RESPIRATION RATE: 18 BRPM | BODY MASS INDEX: 36.4 KG/M2 | TEMPERATURE: 97 F | SYSTOLIC BLOOD PRESSURE: 144 MMHG | WEIGHT: 180.56 LBS | HEART RATE: 97 BPM | OXYGEN SATURATION: 97 % | HEIGHT: 59 IN

## 2024-01-09 DIAGNOSIS — K80.20 SYMPTOMATIC CHOLELITHIASIS: ICD-10-CM

## 2024-01-09 DIAGNOSIS — K82.8 GALLBLADDER SLUDGE: ICD-10-CM

## 2024-01-09 LAB
ALBUMIN SERPL BCP-MCNC: 4.6 G/DL (ref 3.2–4.9)
ALBUMIN/GLOB SERPL: 1.3 G/DL
ALP SERPL-CCNC: 93 U/L (ref 30–99)
ALT SERPL-CCNC: 49 U/L (ref 2–50)
ANION GAP SERPL CALC-SCNC: 14 MMOL/L (ref 7–16)
APPEARANCE UR: CLEAR
AST SERPL-CCNC: 29 U/L (ref 12–45)
BASOPHILS # BLD AUTO: 0.3 % (ref 0–1.8)
BASOPHILS # BLD: 0.04 K/UL (ref 0–0.12)
BILIRUB SERPL-MCNC: 0.4 MG/DL (ref 0.1–1.5)
BILIRUB UR QL STRIP.AUTO: NEGATIVE
BUN SERPL-MCNC: 16 MG/DL (ref 8–22)
CALCIUM ALBUM COR SERPL-MCNC: 9.7 MG/DL (ref 8.5–10.5)
CALCIUM SERPL-MCNC: 10.2 MG/DL (ref 8.5–10.5)
CHLORIDE SERPL-SCNC: 101 MMOL/L (ref 96–112)
CO2 SERPL-SCNC: 24 MMOL/L (ref 20–33)
COLOR UR: ABNORMAL
CREAT SERPL-MCNC: 0.49 MG/DL (ref 0.5–1.4)
EOSINOPHIL # BLD AUTO: 0.1 K/UL (ref 0–0.51)
EOSINOPHIL NFR BLD: 0.7 % (ref 0–6.9)
ERYTHROCYTE [DISTWIDTH] IN BLOOD BY AUTOMATED COUNT: 45.1 FL (ref 35.9–50)
GFR SERPLBLD CREATININE-BSD FMLA CKD-EPI: 126 ML/MIN/1.73 M 2
GLOBULIN SER CALC-MCNC: 3.5 G/DL (ref 1.9–3.5)
GLUCOSE SERPL-MCNC: 106 MG/DL (ref 65–99)
GLUCOSE UR STRIP.AUTO-MCNC: NEGATIVE MG/DL
HCG SERPL QL: NEGATIVE
HCT VFR BLD AUTO: 44.4 % (ref 37–47)
HGB BLD-MCNC: 14.5 G/DL (ref 12–16)
IMM GRANULOCYTES # BLD AUTO: 0.05 K/UL (ref 0–0.11)
IMM GRANULOCYTES NFR BLD AUTO: 0.4 % (ref 0–0.9)
KETONES UR STRIP.AUTO-MCNC: ABNORMAL MG/DL
LEUKOCYTE ESTERASE UR QL STRIP.AUTO: NEGATIVE
LIPASE SERPL-CCNC: 30 U/L (ref 11–82)
LYMPHOCYTES # BLD AUTO: 2.19 K/UL (ref 1–4.8)
LYMPHOCYTES NFR BLD: 15.9 % (ref 22–41)
MCH RBC QN AUTO: 28.4 PG (ref 27–33)
MCHC RBC AUTO-ENTMCNC: 32.7 G/DL (ref 32.2–35.5)
MCV RBC AUTO: 86.9 FL (ref 81.4–97.8)
MICRO URNS: ABNORMAL
MONOCYTES # BLD AUTO: 0.97 K/UL (ref 0–0.85)
MONOCYTES NFR BLD AUTO: 7.1 % (ref 0–13.4)
NEUTROPHILS # BLD AUTO: 10.39 K/UL (ref 1.82–7.42)
NEUTROPHILS NFR BLD: 75.6 % (ref 44–72)
NITRITE UR QL STRIP.AUTO: NEGATIVE
NRBC # BLD AUTO: 0 K/UL
NRBC BLD-RTO: 0 /100 WBC (ref 0–0.2)
PH UR STRIP.AUTO: 5 [PH] (ref 5–8)
PLATELET # BLD AUTO: 210 K/UL (ref 164–446)
PMV BLD AUTO: 11.5 FL (ref 9–12.9)
POTASSIUM SERPL-SCNC: 3.8 MMOL/L (ref 3.6–5.5)
PROT SERPL-MCNC: 8.1 G/DL (ref 6–8.2)
PROT UR QL STRIP: NEGATIVE MG/DL
RBC # BLD AUTO: 5.11 M/UL (ref 4.2–5.4)
RBC UR QL AUTO: NEGATIVE
SODIUM SERPL-SCNC: 139 MMOL/L (ref 135–145)
SP GR UR STRIP.AUTO: 1.03
UROBILINOGEN UR STRIP.AUTO-MCNC: 0.2 MG/DL
WBC # BLD AUTO: 13.7 K/UL (ref 4.8–10.8)

## 2024-01-09 PROCEDURE — 96374 THER/PROPH/DIAG INJ IV PUSH: CPT

## 2024-01-09 PROCEDURE — 81003 URINALYSIS AUTO W/O SCOPE: CPT

## 2024-01-09 PROCEDURE — 80053 COMPREHEN METABOLIC PANEL: CPT

## 2024-01-09 PROCEDURE — 96375 TX/PRO/DX INJ NEW DRUG ADDON: CPT

## 2024-01-09 PROCEDURE — 700111 HCHG RX REV CODE 636 W/ 250 OVERRIDE (IP): Mod: JZ,UD | Performed by: STUDENT IN AN ORGANIZED HEALTH CARE EDUCATION/TRAINING PROGRAM

## 2024-01-09 PROCEDURE — 99284 EMERGENCY DEPT VISIT MOD MDM: CPT

## 2024-01-09 PROCEDURE — 85025 COMPLETE CBC W/AUTO DIFF WBC: CPT

## 2024-01-09 PROCEDURE — 76705 ECHO EXAM OF ABDOMEN: CPT

## 2024-01-09 PROCEDURE — 84703 CHORIONIC GONADOTROPIN ASSAY: CPT

## 2024-01-09 PROCEDURE — 83690 ASSAY OF LIPASE: CPT

## 2024-01-09 PROCEDURE — 36415 COLL VENOUS BLD VENIPUNCTURE: CPT

## 2024-01-09 RX ORDER — ONDANSETRON 2 MG/ML
4 INJECTION INTRAMUSCULAR; INTRAVENOUS ONCE
Status: COMPLETED | OUTPATIENT
Start: 2024-01-09 | End: 2024-01-09

## 2024-01-09 RX ORDER — MORPHINE SULFATE 15 MG/1
15 TABLET ORAL EVERY 12 HOURS PRN
Qty: 6 TABLET | Refills: 0 | Status: SHIPPED | OUTPATIENT
Start: 2024-01-09 | End: 2024-01-12

## 2024-01-09 RX ADMIN — MORPHINE SULFATE 6 MG: 10 INJECTION INTRAVENOUS at 02:09

## 2024-01-09 RX ADMIN — ONDANSETRON 4 MG: 2 INJECTION INTRAMUSCULAR; INTRAVENOUS at 02:10

## 2024-01-09 ASSESSMENT — FIBROSIS 4 INDEX: FIB4 SCORE: .872871560943969525

## 2024-01-09 ASSESSMENT — PAIN DESCRIPTION - DESCRIPTORS: DESCRIPTORS: CRAMPING

## 2024-01-09 ASSESSMENT — PAIN DESCRIPTION - PAIN TYPE
TYPE: ACUTE PAIN

## 2024-01-09 NOTE — ED PROVIDER NOTES
"ED Provider Note    CHIEF COMPLAINT  Chief Complaint   Patient presents with    Abdominal Pain       EXTERNAL RECORDS REVIEWED  PDMP low risk    HPI/ROS  LIMITATION TO HISTORY   Select: Language Surinamese,  Used   OUTSIDE HISTORIAN(S):      Lillian Wesley is a 33 y.o. female who presents with right upper quadrant abdominal pain that began earlier tonight.  Patient reports she has had identical pain to this in the past and was seen in the hospital many months ago.  She was told she needed surgery and was prescribed some medications however the symptoms went away and she did not take the medications or follow-up with surgery as advised.  She denies prior abdominal surgeries in the past.  She reports associated nausea    PAST MEDICAL HISTORY       SURGICAL HISTORY  patient denies any surgical history    FAMILY HISTORY  Family History   Problem Relation Age of Onset    Diabetes Mother     Heart Disease Maternal Aunt     Diabetes Maternal Aunt     Diabetes Maternal Uncle        SOCIAL HISTORY  Social History     Tobacco Use    Smoking status: Never    Smokeless tobacco: Not on file   Vaping Use    Vaping Use: Never used   Substance and Sexual Activity    Alcohol use: No    Drug use: Never    Sexual activity: Not Currently     Birth control/protection: None       CURRENT MEDICATIONS  Home Medications       Reviewed by Santosh Guillaume R.N. (Registered Nurse) on 01/09/24 at 0053  Med List Status: Not Addressed     Medication Last Dose Status   acetaminophen (TYLENOL) 500 MG Tab  Active   docusate sodium 100 MG Cap  Active   ibuprofen (MOTRIN) 800 MG Tab  Active   prenatal plus vitamin (STUARTNATAL 1+1) 27-1 MG Tab tablet  Active                    ALLERGIES  No Known Allergies    PHYSICAL EXAM  VITAL SIGNS: BP (!) 144/81   Pulse 97   Temp 36.1 °C (97 °F) (Temporal)   Resp 18   Ht 1.499 m (4' 11\")   Wt 81.9 kg (180 lb 8.9 oz)   SpO2 97%   BMI 36.47 kg/m²    Physical Exam  Vitals and nursing note reviewed. "   Constitutional:       Appearance: She is well-developed.   HENT:      Head: Normocephalic.   Eyes:      Extraocular Movements: Extraocular movements intact.      Pupils: Pupils are equal, round, and reactive to light.   Cardiovascular:      Rate and Rhythm: Normal rate and regular rhythm.   Pulmonary:      Effort: Pulmonary effort is normal.      Breath sounds: Normal breath sounds.   Abdominal:      Palpations: Abdomen is soft.      Tenderness: There is abdominal tenderness in the right upper quadrant. There is no guarding or rebound. Positive signs include Jamison's sign.   Musculoskeletal:      Cervical back: Normal range of motion.   Neurological:      Mental Status: She is alert and oriented to person, place, and time.         DIAGNOSTIC STUDIES / PROCEDURES      LABS  Labs Reviewed   CBC WITH DIFFERENTIAL - Abnormal; Notable for the following components:       Result Value    WBC 13.7 (*)     Neutrophils-Polys 75.60 (*)     Lymphocytes 15.90 (*)     Neutrophils (Absolute) 10.39 (*)     Monos (Absolute) 0.97 (*)     All other components within normal limits   COMP METABOLIC PANEL - Abnormal; Notable for the following components:    Glucose 106 (*)     Creatinine 0.49 (*)     All other components within normal limits   URINALYSIS,CULTURE IF INDICATED - Abnormal; Notable for the following components:    Color Orange (*)     Ketones Trace (*)     All other components within normal limits    Narrative:     Indication for culture:->Patient WITHOUT an indwelling Pollock  catheter in place with new onset of Dysuria, Frequency,  Urgency, and/or Suprapubic pain   LIPASE   HCG QUAL SERUM   ESTIMATED GFR       RADIOLOGY  I have independently interpreted the diagnostic imaging associated with this visit and am waiting the final reading from the radiologist.   My preliminary interpretation is as follows: Right upper quadrant ultrasound shows presence of biliary sludge with no CBD dilatation pericholecystic fluid or  gallbladder wall thickening  Radiologist interpretation:   US-RUQ   Final Result         1.  Hepatomegaly   2.  Echogenic liver, compatible with fatty change versus fibrosis   3.  Gallbladder sludge            COURSE & MEDICAL DECISION MAKING    ED Observation Status? No; Patient does not meet criteria for ED Observation.     INITIAL ASSESSMENT, COURSE AND PLAN  Care Narrative: 33-year-old female presents to the ED for postprandial right upper quadrant abdominal pain with associated nausea on exam she is well-appearing with normal vital signs she has right upper quadrant abdominal tenderness with positive Jamison sign no jaundice is present.  Differential diagnosis includes pancreatitis, cholecystitis, ascending cholangitis, symptomatic cholelithiasis, gastritis, GERD.  Laboratory workup overall reassuring patient with mild leukocytosis which is overall nonspecific.  Otherwise no elevation of LFTs or bilirubin to suggest cholangitis.  Right upper quadrant ultrasound does show biliary sludge with a normal CBD.  Suspect the patient has asymptomatic suspect the patient's pain is from symptomatic cholelithiasis.  Discussed with her low-fat diet, pain control and general surgery follow-up.  Referral placed and return precautions given for high fevers, intractable vomiting, pain      HTN/IDDM FOLLOW UP:  The patient has known hypertension and is being followed by their primary care doctor      ADDITIONAL PROBLEM LIST  No past medical history on file.    DISPOSITION AND DISCUSSIONS      Escalation of care considered, and ultimately not performed:acute inpatient care management, however at this time, the patient is most appropriate for outpatient management    Barriers to care at this time, including but not limited to: Patient lacks financial resources.     Decision tools and prescription drugs considered including, but not limited to: Pain Medications msir .    FINAL DIAGNOSIS  1. Symptomatic cholelithiasis Acute   2.  Gallbladder sludge Acute          Electronically signed by: Antolin Buckley M.D., 1/9/2024 6:05 AM

## 2024-01-09 NOTE — ED TRIAGE NOTES
".  Chief Complaint   Patient presents with    Abdominal Pain       33 yr patient walked in to triage for above complaint. Patient was seen in [place] for the same thing and per patient was discharged but did not follow instructions and it is coming back. Patient does not remember want was wrong     Pt placed in lobby. Pt educated on triage process. Pt encouraged to alert staff for any changes.     Patient and staff wearing appropriate PPE    BP (!) 137/98   Pulse 84   Temp 36 °C (96.8 °F) (Temporal)   Resp 16   Ht 1.499 m (4' 11\")   Wt 81.9 kg (180 lb 8.9 oz)   SpO2 99%   BMI 36.47 kg/m²     "